# Patient Record
Sex: FEMALE | Race: OTHER | Employment: FULL TIME
[De-identification: names, ages, dates, MRNs, and addresses within clinical notes are randomized per-mention and may not be internally consistent; named-entity substitution may affect disease eponyms.]

---

## 2022-08-11 ENCOUNTER — OFFICE VISIT (OUTPATIENT)
Dept: INTERNAL MEDICINE CLINIC | Facility: CLINIC | Age: 42
End: 2022-08-11
Payer: COMMERCIAL

## 2022-08-11 VITALS
HEIGHT: 66 IN | BODY MASS INDEX: 25.52 KG/M2 | SYSTOLIC BLOOD PRESSURE: 106 MMHG | RESPIRATION RATE: 14 BRPM | DIASTOLIC BLOOD PRESSURE: 70 MMHG | WEIGHT: 158.8 LBS | HEART RATE: 59 BPM | OXYGEN SATURATION: 100 %

## 2022-08-11 DIAGNOSIS — Z00.00 ANNUAL PHYSICAL EXAM: Primary | ICD-10-CM

## 2022-08-11 DIAGNOSIS — J45.909 ASTHMA DUE TO SEASONAL ALLERGIES: ICD-10-CM

## 2022-08-11 PROCEDURE — 99386 PREV VISIT NEW AGE 40-64: CPT | Performed by: INTERNAL MEDICINE

## 2022-08-11 ASSESSMENT — ENCOUNTER SYMPTOMS
CHEST TIGHTNESS: 0
SORE THROAT: 0
SINUS PAIN: 0
BACK PAIN: 0
BLOOD IN STOOL: 0
COUGH: 0
CONSTIPATION: 0
CHOKING: 0
ABDOMINAL DISTENTION: 0
DIARRHEA: 0

## 2022-08-11 ASSESSMENT — PATIENT HEALTH QUESTIONNAIRE - PHQ9
SUM OF ALL RESPONSES TO PHQ QUESTIONS 1-9: 0
SUM OF ALL RESPONSES TO PHQ9 QUESTIONS 1 & 2: 0
SUM OF ALL RESPONSES TO PHQ QUESTIONS 1-9: 0
1. LITTLE INTEREST OR PLEASURE IN DOING THINGS: 0
2. FEELING DOWN, DEPRESSED OR HOPELESS: 0

## 2022-08-11 ASSESSMENT — ANXIETY QUESTIONNAIRES
4. TROUBLE RELAXING: 0
2. NOT BEING ABLE TO STOP OR CONTROL WORRYING: 0
5. BEING SO RESTLESS THAT IT IS HARD TO SIT STILL: 0
GAD7 TOTAL SCORE: 0
7. FEELING AFRAID AS IF SOMETHING AWFUL MIGHT HAPPEN: 0
6. BECOMING EASILY ANNOYED OR IRRITABLE: 0
3. WORRYING TOO MUCH ABOUT DIFFERENT THINGS: 0
1. FEELING NERVOUS, ANXIOUS, OR ON EDGE: 0

## 2022-08-11 NOTE — PROGRESS NOTES
Chief Complaint   Patient presents with    [address] is a 36 y.o. female who presents today for Establish Care    Here for annual exam, and establishing care, she is originally from [facility], has been in [place] for the last 25 years, she is , has 3 kids, ages of 21, 21 and 15 years, currently working in administration with a [employer]. She reports previous primary care needs to [facility], only as needed. She had her Pap signed by GYN, she reports intrauterine device placed 3 years ago. Recent breast augmentation surgery in [place], surgery was 2 weeks ago, she reports significant pain, and uncomfortable feeling. But denies any redness, discharge, or fever. Further work-up she reports having mammogram done, x-rays, and EKG was told there was not significant abnormalities    Has history of seasonal allergies with associated asthma-like disease, she has been seen by allergy specialist, I was told that she may qualify for further therapy, but at that time she was pregnant and could not pursue therapy for allergies. Reports being allergic to the grass, and usually require albuterol, purple disc inhaler which she believes is Advair. But she is not using it at this time, but may needed in the future. Wt Readings from Last 3 Encounters:   22 158 lb 12.8 oz (72 kg)       Vitals:    22 1326   BP: 106/70   Site: Left Upper Arm   Position: Sitting   Pulse: 59   Resp: 14   SpO2: 100%   Weight: 158 lb 12.8 oz (72 kg)   Height: 5' 6\" (1.676 m)        No current outpatient medications on file. Family History   Problem Relation Age of Onset    Diabetes Mother     Diabetes Father 29    Kidney Disease Father         History reviewed. No pertinent past medical history.      Past Surgical History:   Procedure Laterality Date    BREAST ENHANCEMENT SURGERY  2022     SECTION      X3    COSMETIC SURGERY      neck        No flowsheet data found.     IMMUNIZATIONS    There is no immunization history on file for this patient. Health Maintenance Due   Topic Date Due    COVID-19 Vaccine (1) Never done    HIV screen  Never done    Hepatitis C screen  Never done    DTaP/Tdap/Td vaccine (1 - Tdap) Never done    Cervical cancer screen  Never done    Diabetes screen  Never done    Lipids  Never done            /70 (Site: Left Upper Arm, Position: Sitting)   Pulse 59   Resp 14   Ht 5' 6\" (1.676 m)   Wt 158 lb 12.8 oz (72 kg)   LMP  (LMP Unknown)   SpO2 100%   BMI 25.63 kg/m²     Review of Systems   Constitutional:  Negative for activity change, appetite change, chills, fatigue, fever and unexpected weight change. HENT:  Negative for congestion, postnasal drip, sinus pain and sore throat. Respiratory:  Negative for cough, choking and chest tightness. Cardiovascular:  Negative for chest pain, palpitations and leg swelling. Gastrointestinal:  Negative for abdominal distention, blood in stool, constipation and diarrhea. Endocrine: Negative for polydipsia, polyphagia and polyuria. Genitourinary:  Negative for difficulty urinating, dysuria and hematuria. Musculoskeletal:  Positive for myalgias. Negative for arthralgias, back pain and neck pain. Skin:  Negative for rash. Allergic/Immunologic: Positive for environmental allergies. Neurological:  Negative for dizziness and headaches. Psychiatric/Behavioral:  Negative for sleep disturbance and suicidal ideas. The patient is not nervous/anxious. Physical Exam  Vitals reviewed. Constitutional:       Appearance: Normal appearance. HENT:      Head: Normocephalic and atraumatic. Cardiovascular:      Rate and Rhythm: Normal rate and regular rhythm. Pulmonary:      Effort: Pulmonary effort is normal.      Breath sounds: Normal breath sounds. Chest:   Breasts:     Right: Swelling and tenderness present. No bleeding or nipple discharge.       Left: Swelling and tenderness present. No bleeding or nipple discharge. Comments: Lateral breast implants, swelling, with clean surgical incisions, she had a large bruise in the left breast, and bruises in the anterior chest, and neck  Abdominal:      General: Abdomen is flat. Palpations: Abdomen is soft. Musculoskeletal:         General: Normal range of motion. Cervical back: Normal range of motion. Skin:     General: Skin is warm. Capillary Refill: Capillary refill takes less than 2 seconds. Neurological:      General: No focal deficit present. Mental Status: She is alert and oriented to person, place, and time. Psychiatric:         Mood and Affect: Mood normal.         Behavior: Behavior normal.         Assessment  And plan    1. Annual physical exam  -     HIV 1/2 Ag/Ab, 4TH Generation,W Rflx Confirm; Future  -     CBC with Auto Differential; Future  -     Comprehensive Metabolic Panel; Future  -     Lipid Panel; Future  -     TSH; Future  -     Hepatitis C Antibody; Future  2. Asthma due to seasonal allergies    Discussed Primary prevention aims to avert the development of disease including Immunizations, life style modifications (smoking cessation, promoting physical activity: 30 minutes x 3 week of moderate activity , diet changes ), Also discussed about Secondary prevention and in how it focuses on early detection and treatment of asymptomatic disease. Screening for cancer, hearing ,dental or vision impairment, and how failure to do so may result in disease and even dead     She was instructed to consider vaccination to take we will call department for tetanus shot, she will also reestablish care with GYN for Paps, and IUD management. Will complete blood work. Return in about 1 year (around 8/11/2023), or if symptoms worsen or fail to improve, for annual.     This document was generated with the aid of voice recognition software. . Please be aware that there may be inadvertent transcription errors not identified and corrected by the [employer]

## 2022-09-09 DIAGNOSIS — Z00.00 ANNUAL PHYSICAL EXAM: ICD-10-CM

## 2022-09-09 LAB
ALBUMIN SERPL-MCNC: 4.1 G/DL (ref 3.5–5)
ALBUMIN/GLOB SERPL: 1.2 {RATIO} (ref 1.2–3.5)
ALP SERPL-CCNC: 67 U/L (ref 50–136)
ALT SERPL-CCNC: 51 U/L (ref 12–65)
ANION GAP SERPL CALC-SCNC: 5 MMOL/L (ref 4–13)
AST SERPL-CCNC: 28 U/L (ref 15–37)
BASOPHILS # BLD: 0.1 K/UL (ref 0–0.2)
BASOPHILS NFR BLD: 1 % (ref 0–2)
BILIRUB SERPL-MCNC: 0.3 MG/DL (ref 0.2–1.1)
BUN SERPL-MCNC: 13 MG/DL (ref 6–23)
CALCIUM SERPL-MCNC: 9.8 MG/DL (ref 8.3–10.4)
CHLORIDE SERPL-SCNC: 104 MMOL/L (ref 101–110)
CHOLEST SERPL-MCNC: 178 MG/DL
CO2 SERPL-SCNC: 27 MMOL/L (ref 21–32)
CREAT SERPL-MCNC: 0.7 MG/DL (ref 0.6–1)
DIFFERENTIAL METHOD BLD: NORMAL
EOSINOPHIL # BLD: 0.3 K/UL (ref 0–0.8)
EOSINOPHIL NFR BLD: 4 % (ref 0.5–7.8)
ERYTHROCYTE [DISTWIDTH] IN BLOOD BY AUTOMATED COUNT: 11.9 % (ref 11.9–14.6)
GLOBULIN SER CALC-MCNC: 3.3 G/DL (ref 2.3–3.5)
GLUCOSE SERPL-MCNC: 105 MG/DL (ref 65–100)
HCT VFR BLD AUTO: 44.2 % (ref 35.8–46.3)
HCV AB SER QL: NONREACTIVE
HDLC SERPL-MCNC: 56 MG/DL (ref 40–60)
HDLC SERPL: 3.2 {RATIO}
HGB BLD-MCNC: 14.6 G/DL (ref 11.7–15.4)
HIV 1+2 AB+HIV1 P24 AG SERPL QL IA: NONREACTIVE
HIV 1/2 RESULT COMMENT: NORMAL
IMM GRANULOCYTES # BLD AUTO: 0 K/UL (ref 0–0.5)
IMM GRANULOCYTES NFR BLD AUTO: 1 % (ref 0–5)
LDLC SERPL CALC-MCNC: 74.4 MG/DL
LYMPHOCYTES # BLD: 2.2 K/UL (ref 0.5–4.6)
LYMPHOCYTES NFR BLD: 34 % (ref 13–44)
MCH RBC QN AUTO: 31.7 PG (ref 26.1–32.9)
MCHC RBC AUTO-ENTMCNC: 33 G/DL (ref 31.4–35)
MCV RBC AUTO: 96.1 FL (ref 79.6–97.8)
MONOCYTES # BLD: 0.5 K/UL (ref 0.1–1.3)
MONOCYTES NFR BLD: 7 % (ref 4–12)
NEUTS SEG # BLD: 3.4 K/UL (ref 1.7–8.2)
NEUTS SEG NFR BLD: 53 % (ref 43–78)
NRBC # BLD: 0 K/UL (ref 0–0.2)
PLATELET # BLD AUTO: 299 K/UL (ref 150–450)
PMV BLD AUTO: 11.7 FL (ref 9.4–12.3)
POTASSIUM SERPL-SCNC: 4.2 MMOL/L (ref 3.5–5.1)
PROT SERPL-MCNC: 7.4 G/DL (ref 6.3–8.2)
RBC # BLD AUTO: 4.6 M/UL (ref 4.05–5.2)
SODIUM SERPL-SCNC: 136 MMOL/L (ref 136–145)
TRIGL SERPL-MCNC: 238 MG/DL (ref 35–150)
TSH, 3RD GENERATION: 1.75 UIU/ML (ref 0.36–3.74)
VLDLC SERPL CALC-MCNC: 47.6 MG/DL (ref 6–23)
WBC # BLD AUTO: 6.4 K/UL (ref 4.3–11.1)

## 2022-10-19 ENCOUNTER — OFFICE VISIT (OUTPATIENT)
Dept: INTERNAL MEDICINE CLINIC | Facility: CLINIC | Age: 42
End: 2022-10-19
Payer: COMMERCIAL

## 2022-10-19 VITALS
HEIGHT: 66 IN | HEART RATE: 74 BPM | BODY MASS INDEX: 25.75 KG/M2 | SYSTOLIC BLOOD PRESSURE: 124 MMHG | RESPIRATION RATE: 16 BRPM | OXYGEN SATURATION: 95 % | DIASTOLIC BLOOD PRESSURE: 84 MMHG | WEIGHT: 160.2 LBS

## 2022-10-19 DIAGNOSIS — L81.9 HYPERPIGMENTATION OF SKIN OF CHEEK: ICD-10-CM

## 2022-10-19 DIAGNOSIS — R73.01 FASTING HYPERGLYCEMIA: ICD-10-CM

## 2022-10-19 DIAGNOSIS — M71.30 SYNOVIAL CYST: ICD-10-CM

## 2022-10-19 DIAGNOSIS — E78.1 HYPERTRIGLYCERIDEMIA: Primary | ICD-10-CM

## 2022-10-19 PROBLEM — J45.909 ASTHMA: Status: ACTIVE | Noted: 2018-04-16

## 2022-10-19 PROBLEM — J30.9 ALLERGIC RHINITIS: Status: ACTIVE | Noted: 2018-04-16

## 2022-10-19 PROCEDURE — 99214 OFFICE O/P EST MOD 30 MIN: CPT | Performed by: INTERNAL MEDICINE

## 2022-10-19 RX ORDER — ALBUTEROL SULFATE 90 UG/1
AEROSOL, METERED RESPIRATORY (INHALATION)
COMMUNITY

## 2022-10-19 RX ORDER — FLUTICASONE PROPIONATE AND SALMETEROL 250; 50 UG/1; UG/1
POWDER RESPIRATORY (INHALATION)
COMMUNITY

## 2022-10-19 ASSESSMENT — ENCOUNTER SYMPTOMS
ABDOMINAL DISTENTION: 0
CONSTIPATION: 0
SHORTNESS OF BREATH: 0
COUGH: 0
ABDOMINAL PAIN: 0
CHEST TIGHTNESS: 0

## 2022-10-19 NOTE — ASSESSMENT & PLAN NOTE
What you can do to reduce your cholesterol and risk for heart attack and stroke:     Be physically active: Aim for 30 minutes of planned moving activity most days of the week. Include resistance training with 8 to 10 different exercises, 1-2 sets per exercise, and 10-15 repetitions at moderate intensity on two or more days per week. Fill your plate with mostly whole grains, fruits and vegetables with a sprinkle of lean meats and low fat dairy foods. Strive for a healthy weight. If weight loss is a goal, remember it only takes a 10% weight loss to reduce your risk for disease. If you drink alcohol, limit your daily consumption to one drink for women and two drinks for men.

## 2022-10-19 NOTE — PROGRESS NOTES
Chief Complaint   Patient presents with    Follow-up     \"Bump\" on right index finger X 1 month. Luis Linares is a 36 y.o. female who presents today for Follow-up (\"Bump\" on right index finger X 1 month. )    Follow-up in recent labs, showing hypertriglycerides levels, and hyperglycemia, since last visit also she has gained some weight, she reports stress at work, has not been able to exercise, today she is concerned about small bump in the right index finger that is started a month ago, and is not tender, not red,  She also is requesting referral to dermatology for hyperpigmented lesions, she has been seen by dermatology in the past but has not seen good progress. Wt Readings from Last 3 Encounters:   10/19/22 160 lb 3.2 oz (72.7 kg)   08/11/22 158 lb 12.8 oz (72 kg)     Vitals:    10/19/22 0841   BP: 124/84   Site: Left Upper Arm   Position: Sitting   Pulse: 74   Resp: 16   SpO2: 95%   Weight: 160 lb 3.2 oz (72.7 kg)   Height: 5' 6\" (1.676 m)        Assessment and plan:  1. Hypertriglyceridemia  Assessment & Plan:    What you can do to reduce your cholesterol and risk for heart attack and stroke:     Be physically active: Aim for 30 minutes of planned moving activity most days of the week. Include resistance training with 8 to 10 different exercises, 1-2 sets per exercise, and 10-15 repetitions at moderate intensity on two or more days per week. Fill your plate with mostly whole grains, fruits and vegetables with a sprinkle of lean meats and low fat dairy foods. Strive for a healthy weight. If weight loss is a goal, remember it only takes a 10% weight loss to reduce your risk for disease. If you drink alcohol, limit your daily consumption to one drink for women and two drinks for men. Orders:  -     Lipid Panel; Future  2. Fasting hyperglycemia  Assessment & Plan:    Will focus in dietary changes and increase walking  Orders:  -     Hemoglobin A1C; Future  -     Comprehensive Metabolic Panel; Future  3. Synovial cyst  4. Hyperpigmentation of skin of cheek  -     [facility]    Return in about 3 months (around 2023) for hyperlipidemia, hyperglycemia. Review of system:    Review of Systems   Constitutional:  Negative for activity change, chills, fatigue and fever. Respiratory:  Negative for cough, chest tightness and shortness of breath. Cardiovascular:  Negative for chest pain. Gastrointestinal:  Negative for abdominal distention, abdominal pain and constipation. Neurological:  Negative for dizziness and headaches. Psychiatric/Behavioral:  The patient is not nervous/anxious. Immunization history:    Immunization History   Administered Date(s) Administered    COVID-19, PFIZER PURPLE top, DILUTE for use, (age 15 y+), 30mcg/0.3mL 2021, 2021    Hepatitis B 2004, 03/10/2005    Hepatitis B Adult (Engerix-B) 2004, 2004, 2005    MMR 2004    Td (Adult), 2 Lf Tetanus Toxoid, Pf (Td, Absorbed) 2004       Current medications:      Current Outpatient Medications:     fluticasone-salmeterol (ADVAIR) 250-50 MCG/ACT AEPB diskus inhaler, Advair Diskus 250 mcg-50 mcg/dose powder for inhalation  Inhale 1 puff twice a day by inhalation route., Disp: , Rfl:     albuterol sulfate HFA (PROVENTIL;VENTOLIN;PROAIR) 108 (90 Base) MCG/ACT inhaler, ProAir HFA 90 mcg/actuation aerosol inhaler  Inhale 2 puffs every 4-6 hours by inhalation route as needed for 30 days. , Disp: , Rfl:       Family history:    Family History   Problem Relation Age of Onset    Diabetes Mother     Diabetes Father 29    Kidney Disease Father         Past medical history:    History reviewed. No pertinent past medical history.      Past Surgical History:   Procedure Laterality Date    BREAST ENHANCEMENT SURGERY  2022     SECTION      X3    COSMETIC SURGERY      neck        Physical exam:    /84 (Site: Left Upper Arm, Position: Sitting) Pulse 74   Resp 16   Ht 5' 6\" (1.676 m)   Wt 160 lb 3.2 oz (72.7 kg)   SpO2 95%   BMI 25.86 kg/m²     Physical Exam  Vitals reviewed. Musculoskeletal:      Comments: Small cyst  in R finger index   Skin:     Comments: Face hyperpigmented patches        Recent labs:    Lab Results   Component Value Date    CHOL 178 09/09/2022     Lab Results   Component Value Date    TRIG 238 (H) 09/09/2022     Lab Results   Component Value Date    HDL 56 09/09/2022     Lab Results   Component Value Date    LDLCALC 74.4 09/09/2022     Lab Results   Component Value Date    LABVLDL 47.6 (H) 09/09/2022     Lab Results   Component Value Date    CHOLHDLRATIO 3.2 09/09/2022     Lab Results   Component Value Date     09/09/2022    K 4.2 09/09/2022     09/09/2022    CO2 27 09/09/2022    BUN 13 09/09/2022    CREATININE 0.70 09/09/2022    GLUCOSE 105 (H) 09/09/2022    CALCIUM 9.8 09/09/2022    PROT 7.4 09/09/2022    LABALBU 4.1 09/09/2022    BILITOT 0.3 09/09/2022    ALKPHOS 67 09/09/2022    AST 28 09/09/2022    ALT 51 09/09/2022    LABGLOM >60 09/09/2022    GFRAA >60 09/09/2022    GLOB 3.3 09/09/2022     Lab Results   Component Value Date    WBC 6.4 09/09/2022    HGB 14.6 09/09/2022    HCT 44.2 09/09/2022    MCV 96.1 09/09/2022     09/09/2022             This document was generated with the aid of voice recognition software. . Please be aware that there may be inadvertent transcription errors not identified and corrected by the [employer]

## 2022-10-19 NOTE — PATIENT INSTRUCTIONS
A referral has been placed for you. Someone will call you with the details of the appointment and process. Please keep your appointment or be sure to call if you need to reschedule. If you have not received any call after 1 to 2 weeks please contact us.       Labs in 3 months

## 2022-11-16 DIAGNOSIS — E78.1 HYPERTRIGLYCERIDEMIA: ICD-10-CM

## 2022-11-16 DIAGNOSIS — R73.01 FASTING HYPERGLYCEMIA: ICD-10-CM

## 2022-11-17 LAB
ALBUMIN SERPL-MCNC: 4 G/DL (ref 3.5–5)
ALBUMIN/GLOB SERPL: 1.3 {RATIO} (ref 0.4–1.6)
ALP SERPL-CCNC: 61 U/L (ref 50–136)
ALT SERPL-CCNC: 56 U/L (ref 12–65)
ANION GAP SERPL CALC-SCNC: 8 MMOL/L (ref 2–11)
AST SERPL-CCNC: 27 U/L (ref 15–37)
BILIRUB SERPL-MCNC: 0.3 MG/DL (ref 0.2–1.1)
BUN SERPL-MCNC: 11 MG/DL (ref 6–23)
CALCIUM SERPL-MCNC: 10.5 MG/DL (ref 8.3–10.4)
CHLORIDE SERPL-SCNC: 102 MMOL/L (ref 101–110)
CHOLEST SERPL-MCNC: 203 MG/DL
CO2 SERPL-SCNC: 28 MMOL/L (ref 21–32)
CREAT SERPL-MCNC: 0.7 MG/DL (ref 0.6–1)
EST. AVERAGE GLUCOSE BLD GHB EST-MCNC: 123 MG/DL
GLOBULIN SER CALC-MCNC: 3 G/DL (ref 2.8–4.5)
GLUCOSE SERPL-MCNC: 95 MG/DL (ref 65–100)
HBA1C MFR BLD: 5.9 % (ref 4.8–5.6)
HDLC SERPL-MCNC: 66 MG/DL (ref 40–60)
HDLC SERPL: 3.1 {RATIO}
LDLC SERPL CALC-MCNC: 102 MG/DL
POTASSIUM SERPL-SCNC: 4.5 MMOL/L (ref 3.5–5.1)
PROT SERPL-MCNC: 7 G/DL (ref 6.3–8.2)
SODIUM SERPL-SCNC: 138 MMOL/L (ref 133–143)
TRIGL SERPL-MCNC: 175 MG/DL (ref 35–150)
VLDLC SERPL CALC-MCNC: 35 MG/DL (ref 6–23)

## 2023-01-30 ENCOUNTER — OFFICE VISIT (OUTPATIENT)
Dept: INTERNAL MEDICINE CLINIC | Facility: CLINIC | Age: 43
End: 2023-01-30
Payer: COMMERCIAL

## 2023-01-30 VITALS
SYSTOLIC BLOOD PRESSURE: 106 MMHG | HEIGHT: 66 IN | DIASTOLIC BLOOD PRESSURE: 78 MMHG | WEIGHT: 160.8 LBS | HEART RATE: 74 BPM | OXYGEN SATURATION: 98 % | BODY MASS INDEX: 25.84 KG/M2

## 2023-01-30 DIAGNOSIS — Z01.419 CERVICAL SMEAR, AS PART OF ROUTINE GYNECOLOGICAL EXAMINATION: ICD-10-CM

## 2023-01-30 DIAGNOSIS — Z97.5 IUD (INTRAUTERINE DEVICE) IN PLACE: ICD-10-CM

## 2023-01-30 DIAGNOSIS — J45.901 MODERATE ASTHMA WITH ACUTE EXACERBATION, UNSPECIFIED WHETHER PERSISTENT: Primary | ICD-10-CM

## 2023-01-30 DIAGNOSIS — J30.9 ALLERGIC RHINITIS, UNSPECIFIED SEASONALITY, UNSPECIFIED TRIGGER: ICD-10-CM

## 2023-01-30 PROCEDURE — 99214 OFFICE O/P EST MOD 30 MIN: CPT | Performed by: INTERNAL MEDICINE

## 2023-01-30 RX ORDER — FLUTICASONE PROPIONATE AND SALMETEROL 250; 50 UG/1; UG/1
POWDER RESPIRATORY (INHALATION)
Qty: 60 EACH | Refills: 1 | Status: SHIPPED | OUTPATIENT
Start: 2023-01-30

## 2023-01-30 ASSESSMENT — ENCOUNTER SYMPTOMS
SHORTNESS OF BREATH: 0
CHEST TIGHTNESS: 0
COUGH: 0
ABDOMINAL DISTENTION: 0
CONSTIPATION: 0
ABDOMINAL PAIN: 0

## 2023-01-30 NOTE — PATIENT INSTRUCTIONS
Health Maintenance Due   Topic Date Due    Pneumococcal 0-64 years Vaccine (1 - PCV) Never done    DTaP/Tdap/Td vaccine (1 - Tdap) 08/19/2004         COVID-19 Vaccine (3 - Booster for Pfizer series) 09/05/2021    Flu vaccine (1) Never done

## 2023-01-30 NOTE — PROGRESS NOTES
Chief Complaint   Patient presents with    Gynecologic Exam     Needs referral to Dermatologist.        Shailesh Soliman is a 36 y.o. female who presents today for Gynecologic Exam (Needs referral to Dermatologist.)     She is here requesting Pap examination, during the unknown exam back in August was not completed as she was planning to follow-up with her OB/GYN, but unfortunately has not been able to make a follow-up, she has an IUD for the last 3 years, she denies any previous abnormal Paps, she reports occasional spotting, last period was at the beginning of the month. She is considering tubal ligation in the future if possible. She would like to have the IUD removed as she is concerned about her changes in coloration of the face, to be associated with IUD, she has been following with dermatology, she wants to get a second opinion, as a current treatment given by her dermatologist has not been helpful, referral was placed back in October, but she has not yet scheduled that appointment. Information has been shared today    She also is requesting her inhaler for asthma, and is currently 40 reactivation of her allergies, as spring is coming. She is inquiring about allergy testing, and shots. She reports antihistaminics and previous attempts to manage her allergies were Not useful    Wt Readings from Last 3 Encounters:   01/30/23 160 lb 12.8 oz (72.9 kg)   10/19/22 160 lb 3.2 oz (72.7 kg)   08/11/22 158 lb 12.8 oz (72 kg)     Vitals:    01/30/23 0844   BP: 106/78   Site: Left Upper Arm   Position: Sitting   Pulse: 74   SpO2: 98%   Weight: 160 lb 12.8 oz (72.9 kg)   Height: 5' 6\" (1.676 m)        Assessment and plan:  1. Moderate asthma with acute exacerbation, unspecified whether persistent  -     fluticasone-salmeterol (ADVAIR) 250-50 MCG/ACT AEPB diskus inhaler;  Advair Diskus 250 mcg-50 mcg/dose powder for inhalation  Inhale 1 puff twice a day by inhalation route., Disp-60 each, R-1Normal  -     [facility]  2. Allergic rhinitis, unspecified seasonality, unspecified trigger  -     [facility]  3. IUD (intrauterine device) in place  -     [address] [facility] ([place], [place]), [place], , Gynecology, [place]  4. Cervical smear, as part of routine gynecological examination  -     PAP IGP,Aptima HPV Age Guideline; Future  Refill of her medications have been placed,  Information about referral placed in October has been given to the patient. Allergy referral has been placed as patient is interested to discuss possible treatments    Referral to gynecology has been placed, we discussed about the findings today during his cervical examination, she will be discussing these findings, and her IUD management with GYN,    No follow-ups on file. Review of system:    Review of Systems   Constitutional:  Negative for activity change, chills, fatigue and fever. Respiratory:  Negative for cough, chest tightness and shortness of breath. Cardiovascular:  Negative for chest pain. Gastrointestinal:  Negative for abdominal distention, abdominal pain and constipation. Genitourinary:  Negative for dyspareunia and pelvic pain. Skin:  Positive for rash. Allergic/Immunologic: Positive for environmental allergies. Neurological:  Negative for dizziness and headaches. Psychiatric/Behavioral:  The patient is not nervous/anxious.         Immunization history:    Immunization History   Administered Date(s) Administered    COVID-19, PFIZER PURPLE top, DILUTE for use, (age 15 y+), 30mcg/0.3mL 04/08/2021, 07/11/2021    Hepatitis B 08/18/2004, 03/10/2005    Hepatitis B Adult (Engerix-B) 08/18/2004, 09/19/2004, 02/19/2005    MMR 08/18/2004    Td (Adult), 2 Lf Tetanus Toxoid, Pf (Td, Absorbed) 08/18/2004       Current medications:      Current Outpatient Medications:     fluticasone-salmeterol (ADVAIR) 250-50 MCG/ACT AEPB diskus inhaler, Advair Diskus 250 mcg-50 mcg/dose powder for inhalation  Inhale 1 puff twice a day by inhalation route., Disp: 60 each, Rfl: 1    albuterol sulfate HFA (PROVENTIL;VENTOLIN;PROAIR) 108 (90 Base) MCG/ACT inhaler, ProAir HFA 90 mcg/actuation aerosol inhaler  Inhale 2 puffs every 4-6 hours by inhalation route as needed for 30 days. , Disp: , Rfl:       Family history:    Family History   Problem Relation Age of Onset    Diabetes Mother     Diabetes Father 29    Kidney Disease Father         Past medical history:    History reviewed. No pertinent past medical history. Past Surgical History:   Procedure Laterality Date    BREAST ENHANCEMENT SURGERY  2022     SECTION      X3    COSMETIC SURGERY      neck        Physical exam:    /78 (Site: Left Upper Arm, Position: Sitting)   Pulse 74   Ht 5' 6\" (1.676 m)   Wt 160 lb 12.8 oz (72.9 kg)   LMP  (LMP Unknown)   SpO2 98%   BMI 25.95 kg/m²     Physical Exam  Vitals reviewed. Constitutional:       Appearance: Normal appearance. HENT:      Head: Normocephalic and atraumatic. Cardiovascular:      Rate and Rhythm: Normal rate. Pulmonary:      Effort: Pulmonary effort is normal.   Abdominal:      Palpations: Abdomen is soft. Genitourinary:     Vagina: No vaginal discharge. Cervix: Normal.      Adnexa:         Right: No mass. Left: No mass. Neurological:      General: No focal deficit present. Mental Status: She is alert and oriented to person, place, and time.    Psychiatric:         Mood and Affect: Mood normal.         Behavior: Behavior normal.        Recent labs:    Lab Results   Component Value Date    CHOL 203 (H) 2022    CHOL 178 2022     Lab Results   Component Value Date    TRIG 175 (H) 2022    TRIG 238 (H) 2022     Lab Results   Component Value Date    HDL 66 (H) 2022    HDL 56 2022     Lab Results   Component Value Date    LDLCALC 102 (H) 2022    LDLCALC 74.4 2022     Lab Results Component Value Date    LABVLDL 35 (H) 11/16/2022    LABVLDL 47.6 (H) 09/09/2022     Lab Results   Component Value Date    CHOLHDLRATIO 3.1 11/16/2022    CHOLHDLRATIO 3.2 09/09/2022     Lab Results   Component Value Date     11/16/2022    K 4.5 11/16/2022     11/16/2022    CO2 28 11/16/2022    BUN 11 11/16/2022    CREATININE 0.70 11/16/2022    GLUCOSE 95 11/16/2022    CALCIUM 10.5 (H) 11/16/2022    PROT 7.0 11/16/2022    LABALBU 4.0 11/16/2022    BILITOT 0.3 11/16/2022    ALKPHOS 61 11/16/2022    AST 27 11/16/2022    ALT 56 11/16/2022    LABGLOM >60 11/16/2022    GFRAA >60 09/09/2022    GLOB 3.0 11/16/2022     Lab Results   Component Value Date    WBC 6.4 09/09/2022    HGB 14.6 09/09/2022    HCT 44.2 09/09/2022    MCV 96.1 09/09/2022     09/09/2022             This document was generated with the aid of voice recognition software. . Please be aware that there may be inadvertent transcription errors not identified and corrected by the [employer]

## 2023-02-02 ENCOUNTER — TELEMEDICINE (OUTPATIENT)
Dept: INTERNAL MEDICINE CLINIC | Facility: CLINIC | Age: 43
End: 2023-02-02
Payer: COMMERCIAL

## 2023-02-02 DIAGNOSIS — J02.9 SORETHROAT: Primary | ICD-10-CM

## 2023-02-02 DIAGNOSIS — U07.1 COVID-19 VIRUS INFECTION: ICD-10-CM

## 2023-02-02 PROCEDURE — 99214 OFFICE O/P EST MOD 30 MIN: CPT | Performed by: INTERNAL MEDICINE

## 2023-02-02 SDOH — ECONOMIC STABILITY: FOOD INSECURITY: WITHIN THE PAST 12 MONTHS, YOU WORRIED THAT YOUR FOOD WOULD RUN OUT BEFORE YOU GOT MONEY TO BUY MORE.: PATIENT DECLINED

## 2023-02-02 SDOH — ECONOMIC STABILITY: TRANSPORTATION INSECURITY
IN THE PAST 12 MONTHS, HAS LACK OF TRANSPORTATION KEPT YOU FROM MEETINGS, WORK, OR FROM GETTING THINGS NEEDED FOR DAILY LIVING?: PATIENT DECLINED

## 2023-02-02 SDOH — ECONOMIC STABILITY: HOUSING INSECURITY
IN THE LAST 12 MONTHS, WAS THERE A TIME WHEN YOU DID NOT HAVE A STEADY PLACE TO SLEEP OR SLEPT IN A SHELTER (INCLUDING NOW)?: PATIENT REFUSED

## 2023-02-02 SDOH — ECONOMIC STABILITY: FOOD INSECURITY: WITHIN THE PAST 12 MONTHS, THE FOOD YOU BOUGHT JUST DIDN'T LAST AND YOU DIDN'T HAVE MONEY TO GET MORE.: PATIENT DECLINED

## 2023-02-02 SDOH — ECONOMIC STABILITY: INCOME INSECURITY: HOW HARD IS IT FOR YOU TO PAY FOR THE VERY BASICS LIKE FOOD, HOUSING, MEDICAL CARE, AND HEATING?: PATIENT DECLINED

## 2023-02-02 ASSESSMENT — ENCOUNTER SYMPTOMS
SHORTNESS OF BREATH: 1
COUGH: 1
TROUBLE SWALLOWING: 1
SINUS PAIN: 1
RHINORRHEA: 1
SORE THROAT: 1

## 2023-02-02 NOTE — PROGRESS NOTES
Carrie Marks (:  1980) is a Established patient, here for evaluation of the following:  Chief Complaint   Patient presents with    Pharyngitis     Started sx 2 days ago. Sore throat, ha, sinus pressure, ear pain, feels hot. Pt is going to do a Covid test as soon as she gets one from the pharmacy. Assessment & Plan   Below is the assessment and plan developed based on review of pertinent history, physical exam, labs, studies, and medications. 1. Sorethroat  -     nirmatrelvir/ritonavir 300/100 (PAXLOVID) 20 x 150 MG & 10 x 100MG TBPK; Take 3 tablets (two 150 mg nirmatrelvir and one 100 mg ritonavir tablets) by mouth every 12 hours for 5 days. , Disp-30 tablet, R-0Normal  2. COVID-19 virus infection  -     nirmatrelvir/ritonavir 300/100 (PAXLOVID) 20 x 150 MG & 10 x 100MG TBPK; Take 3 tablets (two 150 mg nirmatrelvir and one 100 mg ritonavir tablets) by mouth every 12 hours for 5 days. , Disp-30 tablet, R-0Normal  Tested positive for COVID, we discussed about the options of treatment, she is going to continue using her inhalers, and will start antiviral medication. Discussed with patient supportive measures normal saline solution in each nostril, flonase  , humidifer , increase fluid intake , tylenol or Nsaids PRN , (if not Contraindicated), Red flags explained : cough > 4 weeks , fever > 48 hours with not response to tylenol , worsening of symptoms , bloody sputum. , if any o this was advised to come back    Return if symptoms worsen or fail to improve. Subjective   Patient is presenting today with sore throat, chills, sinus congestion, subjective fever, postnasal drip, malaise, body aches, that is started 2 days ago, she has been trying her inhalers, but has been feeling worse, patient has not checked for COVID. She has been highly encouraged to check for COVID, as she has a history of asthma, and this is still in the therapeutic window.   COVID POSITIVE + , called her back at 2:40 pm    Pharyngitis  Associated symptoms include chills, congestion, coughing, diaphoresis, fatigue, headaches and a sore throat. Review of Systems   Constitutional:  Positive for chills, diaphoresis and fatigue. HENT:  Positive for congestion, postnasal drip, rhinorrhea, sinus pain, sore throat and trouble swallowing. Respiratory:  Positive for cough and shortness of breath. Neurological:  Positive for headaches. Objective   Patient-Reported Vitals  No data recorded     Physical Exam  Constitutional:       General: She is not in acute distress. Appearance: Normal appearance. She is not ill-appearing, toxic-appearing or diaphoretic. HENT:      Head: Normocephalic and atraumatic. Nose: Congestion present. Pulmonary:      Effort: Pulmonary effort is normal.   Neurological:      General: No focal deficit present. Mental Status: She is alert. On this date 2/2/2023 I have spent 15 minutes reviewing previous notes, test results and face to face (virtual) with the patient discussing the diagnosis and importance of compliance with the treatment plan as well as documenting on the day of the visitRegi Ocampo, was evaluated through a synchronous (real-time) audio-video encounter. The patient (or guardian if applicable) is aware that this is a billable service, which includes applicable co-pays. This Virtual Visit was conducted with patient's (and/or legal guardian's) consent. The visit was conducted pursuant to the emergency declaration under the [address] [place] [address], [address] [place] [address] authority and the [employer] and [employer] General Act. Patient identification was verified, and a caregiver was present when appropriate.    The patient was located at Home: [address] [facility] [address]  Provider was located at [facility] ([address] [place], [place] [address]t): [address] [place] [phone]         --ADRIANNA YADAV MD

## 2023-02-07 LAB
AGE GDLN ACOG TESTING: NORMAL
CYTOLOGIST CVX/VAG CYTO: ABNORMAL
CYTOLOGY CVX/VAG DOC THIN PREP: ABNORMAL
HPV APTIMA: NEGATIVE
HPV GENOTYPE REFLEX: ABNORMAL
Lab: ABNORMAL
PATH REPORT.FINAL DX SPEC: ABNORMAL
PATHOLOGIST CVX/VAG CYTO: ABNORMAL
PATHOLOGIST PROVIDED ICD: ABNORMAL
RECOM F/U CVX/VAG CYTO: ABNORMAL
STAT OF ADQ CVX/VAG CYTO-IMP: ABNORMAL

## 2023-02-23 ENCOUNTER — OFFICE VISIT (OUTPATIENT)
Dept: OBGYN CLINIC | Age: 43
End: 2023-02-23
Payer: COMMERCIAL

## 2023-02-23 VITALS
SYSTOLIC BLOOD PRESSURE: 122 MMHG | HEIGHT: 66 IN | BODY MASS INDEX: 25.71 KG/M2 | WEIGHT: 160 LBS | DIASTOLIC BLOOD PRESSURE: 84 MMHG

## 2023-02-23 DIAGNOSIS — Z30.09 ENCOUNTER FOR COUNSELING REGARDING CONTRACEPTION: Primary | ICD-10-CM

## 2023-02-23 DIAGNOSIS — R87.610 ASCUS OF CERVIX WITH NEGATIVE HIGH RISK HPV: ICD-10-CM

## 2023-02-23 DIAGNOSIS — N95.1 PERIMENOPAUSE: ICD-10-CM

## 2023-02-23 PROCEDURE — 99203 OFFICE O/P NEW LOW 30 MIN: CPT | Performed by: OBSTETRICS & GYNECOLOGY

## 2023-02-23 RX ORDER — LEVONORGESTREL 52 MG/1
1 INTRAUTERINE DEVICE INTRAUTERINE ONCE
COMMUNITY

## 2023-02-23 NOTE — PROGRESS NOTES
CC:   Chief Complaint   Patient presents with    IUD Removal         HPI:    Kelsea Montgomery  is a 36 y.o. , No obstetric history on file., No LMP recorded (lmp unknown). (Menstrual status: IUD). ,  who is seen for contraception counseling and IUD management. IUD was placed 3 years ago and she is concerned over new facial darkening. Already seeing derm. This is her second IUD and she is otherwise happy with it. No periods. Sometimes gets a hot flash. Asking about menopausal transition. Contraception:  Mirena IUD placed 3 years       GYN HISTORY:  As per HPI     Hx STDs:  no    Last Pap:  ASCUS/HPV negative       Current Outpatient Medications on File Prior to Visit   Medication Sig Dispense Refill    levonorgestrel (MIRENA, 52 MG,) IUD 52 mg 1 each by IntraUTERine route once      fluticasone-salmeterol (ADVAIR) 250-50 MCG/ACT AEPB diskus inhaler Advair Diskus 250 mcg-50 mcg/dose powder for inhalation  Inhale 1 puff twice a day by inhalation route. 60 each 1    albuterol sulfate HFA (PROVENTIL;VENTOLIN;PROAIR) 108 (90 Base) MCG/ACT inhaler ProAir HFA 90 mcg/actuation aerosol inhaler   Inhale 2 puffs every 4-6 hours by inhalation route as needed for 30 days. No current facility-administered medications on file prior to visit. No past medical history on file. Past Surgical History:   Procedure Laterality Date    BREAST ENHANCEMENT SURGERY  2022     SECTION      X3    COSMETIC SURGERY      neck         Outpatient Encounter Medications as of 2023   Medication Sig Dispense Refill    levonorgestrel (MIRENA, 52 MG,) IUD 52 mg 1 each by IntraUTERine route once      fluticasone-salmeterol (ADVAIR) 250-50 MCG/ACT AEPB diskus inhaler Advair Diskus 250 mcg-50 mcg/dose powder for inhalation  Inhale 1 puff twice a day by inhalation route.  60 each 1    albuterol sulfate HFA (PROVENTIL;VENTOLIN;PROAIR) 108 (90 Base) MCG/ACT inhaler ProAir HFA 90 mcg/actuation aerosol inhaler   Inhale 2 puffs every 4-6 hours by inhalation route as needed for 30 days. No facility-administered encounter medications on file as of 2/23/2023. No Known Allergies      Family History   Problem Relation Age of Onset    Diabetes Father 29    Kidney Disease Father     Diabetes Mother     Breast Cancer Neg Hx     Colon Cancer Neg Hx     Ovarian Cancer Neg Hx          Social History     Socioeconomic History    Marital status:    Tobacco Use    Smoking status: Never    Smokeless tobacco: Never   Vaping Use    Vaping Use: Never used   Substance and Sexual Activity    Alcohol use: Never    Drug use: Never    Sexual activity: Yes     Partners: Male     Comment: IUD           ROS:  Review of Systems   Constitutional: Negative for chills, fever and weight loss. HENT: Negative for hearing loss. Eyes: Negative for blurred vision and double vision. Respiratory: Negative for cough, hemoptysis and shortness of breath. Cardiovascular: Negative for chest pain, palpitations and orthopnea. Gastrointestinal: Negative for abdominal pain, blood in stool, constipation, diarrhea, nausea and vomiting. Genitourinary: Negative for dysuria, frequency, hematuria and urgency. Musculoskeletal: Negative for falls, joint pain and myalgias. Skin: Negative for itching and rash. Neurological: Negative for headaches. Endo/Heme/Allergies: Does not bruise/bleed easily. Psychiatric/Behavioral: Negative for depression and suicidal ideas. The patient is not nervous/anxious. All other systems reviewed and are negative. PHYSICAL EXAM:  /84   Ht 5' 6\" (1.676 m)   Wt 160 lb (72.6 kg)   LMP  (LMP Unknown)   BMI 25.82 kg/m²        Physical Exam:  Constitutional: She appears well-developed and well-nourished. No distress. HENT:    Head: Normocephalic and atraumatic. Cardiovascular: Regular pulse   Pulmonary/Chest: Effort normal  Skin: She is not diaphoretic. Psychiatric: She has a normal mood and affect.  Her behavior is normal. Thought content normal. .      Counseling:    Patient counseled face to face for more than 50% of the total time spent with the patient. On this date I have spent 30 minutes reviewing previous notes, test results, and face to face with the patient discussing the diagnosis and importance of compliance with the treatment plan as well as documenting. ASSESSMENT/PLAN:   36 y.o., No obstetric history on file. , with    1. Contraception counseling: Will leave IUD in for now. Discussed that it is good for 8 years. Would not think it is contributing for her hyperpigmentation. Information given on other options if she desires removal just call. 2. ASCUS HPV negative: repeat cotest in 12 mo. Will schedule her. On call list  3. Perimenopause: discussed transition with her and that perimenopause can occur years before menopause.        Darcy Sheppard, DO

## 2023-07-19 ENCOUNTER — OFFICE VISIT (OUTPATIENT)
Dept: INTERNAL MEDICINE CLINIC | Facility: CLINIC | Age: 43
End: 2023-07-19
Payer: COMMERCIAL

## 2023-07-19 VITALS
WEIGHT: 157.2 LBS | HEART RATE: 70 BPM | SYSTOLIC BLOOD PRESSURE: 126 MMHG | BODY MASS INDEX: 25.26 KG/M2 | HEIGHT: 66 IN | DIASTOLIC BLOOD PRESSURE: 64 MMHG | OXYGEN SATURATION: 98 %

## 2023-07-19 DIAGNOSIS — J45.20 MILD INTERMITTENT ASTHMA WITHOUT COMPLICATION: ICD-10-CM

## 2023-07-19 DIAGNOSIS — R73.01 FASTING HYPERGLYCEMIA: ICD-10-CM

## 2023-07-19 DIAGNOSIS — J30.89 NON-SEASONAL ALLERGIC RHINITIS DUE TO OTHER ALLERGIC TRIGGER: ICD-10-CM

## 2023-07-19 DIAGNOSIS — R73.01 FASTING HYPERGLYCEMIA: Primary | ICD-10-CM

## 2023-07-19 DIAGNOSIS — E78.1 HYPERTRIGLYCERIDEMIA: ICD-10-CM

## 2023-07-19 LAB
ALBUMIN SERPL-MCNC: 4 G/DL (ref 3.5–5)
ALBUMIN/GLOB SERPL: 1.3 (ref 0.4–1.6)
ALP SERPL-CCNC: 63 U/L (ref 50–136)
ALT SERPL-CCNC: 44 U/L (ref 12–65)
ANION GAP SERPL CALC-SCNC: 8 MMOL/L (ref 2–11)
AST SERPL-CCNC: 26 U/L (ref 15–37)
BASOPHILS # BLD: 0.1 K/UL (ref 0–0.2)
BASOPHILS NFR BLD: 1 % (ref 0–2)
BILIRUB SERPL-MCNC: 0.4 MG/DL (ref 0.2–1.1)
BUN SERPL-MCNC: 11 MG/DL (ref 6–23)
CALCIUM SERPL-MCNC: 9.7 MG/DL (ref 8.3–10.4)
CHLORIDE SERPL-SCNC: 107 MMOL/L (ref 101–110)
CHOLEST SERPL-MCNC: 199 MG/DL
CO2 SERPL-SCNC: 25 MMOL/L (ref 21–32)
CREAT SERPL-MCNC: 0.6 MG/DL (ref 0.6–1)
DIFFERENTIAL METHOD BLD: NORMAL
EOSINOPHIL # BLD: 0.3 K/UL (ref 0–0.8)
EOSINOPHIL NFR BLD: 5 % (ref 0.5–7.8)
ERYTHROCYTE [DISTWIDTH] IN BLOOD BY AUTOMATED COUNT: 12.2 % (ref 11.9–14.6)
GLOBULIN SER CALC-MCNC: 3.1 G/DL (ref 2.8–4.5)
GLUCOSE SERPL-MCNC: 95 MG/DL (ref 65–100)
HCT VFR BLD AUTO: 44.9 % (ref 35.8–46.3)
HDLC SERPL-MCNC: 68 MG/DL (ref 40–60)
HDLC SERPL: 2.9
HGB BLD-MCNC: 15.1 G/DL (ref 11.7–15.4)
IMM GRANULOCYTES # BLD AUTO: 0 K/UL (ref 0–0.5)
IMM GRANULOCYTES NFR BLD AUTO: 0 % (ref 0–5)
LDLC SERPL CALC-MCNC: 99.2 MG/DL
LYMPHOCYTES # BLD: 1.9 K/UL (ref 0.5–4.6)
LYMPHOCYTES NFR BLD: 36 % (ref 13–44)
MCH RBC QN AUTO: 31.4 PG (ref 26.1–32.9)
MCHC RBC AUTO-ENTMCNC: 33.6 G/DL (ref 31.4–35)
MCV RBC AUTO: 93.3 FL (ref 82–102)
MONOCYTES # BLD: 0.4 K/UL (ref 0.1–1.3)
MONOCYTES NFR BLD: 8 % (ref 4–12)
NEUTS SEG # BLD: 2.8 K/UL (ref 1.7–8.2)
NEUTS SEG NFR BLD: 50 % (ref 43–78)
NRBC # BLD: 0 K/UL (ref 0–0.2)
PLATELET # BLD AUTO: 254 K/UL (ref 150–450)
PMV BLD AUTO: 11.9 FL (ref 9.4–12.3)
POTASSIUM SERPL-SCNC: 4.7 MMOL/L (ref 3.5–5.1)
PROT SERPL-MCNC: 7.1 G/DL (ref 6.3–8.2)
RBC # BLD AUTO: 4.81 M/UL (ref 4.05–5.2)
SODIUM SERPL-SCNC: 140 MMOL/L (ref 133–143)
TRIGL SERPL-MCNC: 159 MG/DL (ref 35–150)
TSH W FREE THYROID IF ABNORMAL: 2.23 UIU/ML (ref 0.36–3.74)
VLDLC SERPL CALC-MCNC: 31.8 MG/DL (ref 6–23)
WBC # BLD AUTO: 5.4 K/UL (ref 4.3–11.1)

## 2023-07-19 PROCEDURE — 99214 OFFICE O/P EST MOD 30 MIN: CPT | Performed by: INTERNAL MEDICINE

## 2023-07-19 RX ORDER — ALBUTEROL SULFATE 90 UG/1
AEROSOL, METERED RESPIRATORY (INHALATION)
Qty: 3 EACH | Refills: 1 | Status: SHIPPED
Start: 2023-07-19

## 2023-07-19 RX ORDER — FLUTICASONE PROPIONATE AND SALMETEROL 250; 50 UG/1; UG/1
POWDER RESPIRATORY (INHALATION)
Qty: 60 EACH | Refills: 1 | Status: SHIPPED | OUTPATIENT
Start: 2023-07-19

## 2023-07-19 ASSESSMENT — PATIENT HEALTH QUESTIONNAIRE - PHQ9
1. LITTLE INTEREST OR PLEASURE IN DOING THINGS: 0
SUM OF ALL RESPONSES TO PHQ QUESTIONS 1-9: 0
SUM OF ALL RESPONSES TO PHQ QUESTIONS 1-9: 0
SUM OF ALL RESPONSES TO PHQ9 QUESTIONS 1 & 2: 0
2. FEELING DOWN, DEPRESSED OR HOPELESS: NOT AT ALL
2. FEELING DOWN, DEPRESSED OR HOPELESS: 0
SUM OF ALL RESPONSES TO PHQ QUESTIONS 1-9: 0
1. LITTLE INTEREST OR PLEASURE IN DOING THINGS: NOT AT ALL
SUM OF ALL RESPONSES TO PHQ9 QUESTIONS 1 & 2: 0
SUM OF ALL RESPONSES TO PHQ QUESTIONS 1-9: 0

## 2023-07-19 ASSESSMENT — ENCOUNTER SYMPTOMS
SHORTNESS OF BREATH: 0
COUGH: 0
CHEST TIGHTNESS: 0
CONSTIPATION: 0
ABDOMINAL PAIN: 0
ABDOMINAL DISTENTION: 0

## 2023-07-19 NOTE — ASSESSMENT & PLAN NOTE
Borderline controlled, continue current medications, medication adherence emphasized and lifestyle modifications recommended   .

## 2023-07-19 NOTE — ASSESSMENT & PLAN NOTE
Patient has been advised to be compliant with recommendations by her allergist, montelukast, Xyzal, and to reach out for the possibility of giving her the medications suggested, she has been advised to try to decrease the use of Zyrtec-D, and instead transition to Zyrtec, patient will contact again her allergist

## 2023-07-19 NOTE — PROGRESS NOTES
Mild intermittent asthma without complication  Assessment & Plan:   Borderline controlled, continue current medications, medication adherence emphasized and lifestyle modifications recommended   . Orders:  -     fluticasone-salmeterol (ADVAIR) 250-50 MCG/ACT AEPB diskus inhaler; Advair Diskus 250 mcg-50 mcg/dose powder for inhalation  Inhale 1 puff twice a day by inhalation route., Disp-60 each, R-1Normal  -     albuterol sulfate HFA (PROVENTIL;VENTOLIN;PROAIR) 108 (90 Base) MCG/ACT inhaler; ProAir HFA 90 mcg/actuation aerosol inhaler  Inhale 2 puffs every 4-6 hours by inhalation route as needed, Disp-3 each, R-1Adjust Sig      Return in about 6 months (around 1/19/2024) for annual.     Review of system:    Review of Systems   Constitutional:  Negative for activity change, chills, fatigue and fever. Respiratory:  Negative for cough, chest tightness and shortness of breath. Cardiovascular:  Negative for chest pain. Gastrointestinal:  Negative for abdominal distention, abdominal pain and constipation. Allergic/Immunologic: Positive for environmental allergies and food allergies. Neurological:  Negative for dizziness and headaches. Psychiatric/Behavioral:  The patient is not nervous/anxious.         Immunization history:    Immunization History   Administered Date(s) Administered    COVID-19, PFIZER PURPLE top, DILUTE for use, (age 15 y+), 30mcg/0.3mL 04/08/2021, 07/11/2021    Hep B, ENGERIX-B, (age 20y+), IM, 1mL 08/18/2004, 09/19/2004, 02/19/2005    Hepatitis B 08/18/2004, 03/10/2005    MMR, Juan A Graft, M-M-R II, (age 12m+), SC, 0.5mL 08/18/2004    TD 2LF, TDVAX, (age 7y+), IM, 0.5mL 08/18/2004       Current medications:      Current Outpatient Medications:     fluticasone-salmeterol (ADVAIR) 250-50 MCG/ACT AEPB diskus inhaler, Advair Diskus 250 mcg-50 mcg/dose powder for inhalation  Inhale 1 puff twice a day by inhalation route., Disp: 60 each, Rfl: 1    albuterol sulfate HFA (PROVENTIL;VENTOLIN;PROAIR)

## 2023-07-20 LAB
EST. AVERAGE GLUCOSE BLD GHB EST-MCNC: 114 MG/DL
HBA1C MFR BLD: 5.6 % (ref 4.8–5.6)

## 2023-07-26 DIAGNOSIS — J45.20 MILD INTERMITTENT ASTHMA WITHOUT COMPLICATION: ICD-10-CM

## 2023-07-26 RX ORDER — ALBUTEROL SULFATE 90 UG/1
AEROSOL, METERED RESPIRATORY (INHALATION)
Qty: 3 EACH | Refills: 1 | Status: SHIPPED | OUTPATIENT
Start: 2023-07-26

## 2023-07-26 NOTE — TELEPHONE ENCOUNTER
Requested Prescriptions     Pending Prescriptions Disp Refills    albuterol sulfate HFA (PROVENTIL;VENTOLIN;PROAIR) 108 (90 Base) MCG/ACT inhaler 3 each 1     Sig: ProAir HFA 90 mcg/actuation aerosol inhaler  Inhale 2 puffs every 4-6 hours by inhalation route as needed

## 2023-09-14 ENCOUNTER — OFFICE VISIT (OUTPATIENT)
Dept: INTERNAL MEDICINE CLINIC | Facility: CLINIC | Age: 43
End: 2023-09-14
Payer: COMMERCIAL

## 2023-09-14 VITALS
BODY MASS INDEX: 25.62 KG/M2 | OXYGEN SATURATION: 96 % | HEIGHT: 66 IN | SYSTOLIC BLOOD PRESSURE: 102 MMHG | WEIGHT: 159.4 LBS | HEART RATE: 71 BPM | DIASTOLIC BLOOD PRESSURE: 70 MMHG

## 2023-09-14 DIAGNOSIS — G44.89 OTHER HEADACHE SYNDROME: ICD-10-CM

## 2023-09-14 DIAGNOSIS — J30.89 NON-SEASONAL ALLERGIC RHINITIS DUE TO OTHER ALLERGIC TRIGGER: ICD-10-CM

## 2023-09-14 DIAGNOSIS — Z12.31 SCREENING MAMMOGRAM FOR BREAST CANCER: ICD-10-CM

## 2023-09-14 DIAGNOSIS — Z00.00 ENCOUNTER FOR WELL ADULT EXAM WITHOUT ABNORMAL FINDINGS: Primary | ICD-10-CM

## 2023-09-14 PROCEDURE — 99396 PREV VISIT EST AGE 40-64: CPT | Performed by: INTERNAL MEDICINE

## 2023-09-14 RX ORDER — MONTELUKAST SODIUM 10 MG/1
10 TABLET ORAL
COMMUNITY
Start: 2023-07-26 | End: 2023-09-14 | Stop reason: SDUPTHER

## 2023-09-14 RX ORDER — MONTELUKAST SODIUM 10 MG/1
10 TABLET ORAL NIGHTLY
Qty: 90 TABLET | Refills: 3 | Status: SHIPPED | OUTPATIENT
Start: 2023-09-14

## 2023-09-14 ASSESSMENT — ENCOUNTER SYMPTOMS
CONSTIPATION: 0
CHEST TIGHTNESS: 0
BLOOD IN STOOL: 0
BACK PAIN: 0
SINUS PAIN: 0
SORE THROAT: 0
COUGH: 0
CHOKING: 0
DIARRHEA: 0
ABDOMINAL DISTENTION: 0

## 2024-05-07 SDOH — ECONOMIC STABILITY: TRANSPORTATION INSECURITY
IN THE PAST 12 MONTHS, HAS LACK OF TRANSPORTATION KEPT YOU FROM MEETINGS, WORK, OR FROM GETTING THINGS NEEDED FOR DAILY LIVING?: NO

## 2024-05-07 SDOH — ECONOMIC STABILITY: HOUSING INSECURITY
IN THE LAST 12 MONTHS, WAS THERE A TIME WHEN YOU DID NOT HAVE A STEADY PLACE TO SLEEP OR SLEPT IN A SHELTER (INCLUDING NOW)?: NO

## 2024-05-07 SDOH — ECONOMIC STABILITY: FOOD INSECURITY: WITHIN THE PAST 12 MONTHS, THE FOOD YOU BOUGHT JUST DIDN'T LAST AND YOU DIDN'T HAVE MONEY TO GET MORE.: SOMETIMES TRUE

## 2024-05-07 SDOH — ECONOMIC STABILITY: INCOME INSECURITY: HOW HARD IS IT FOR YOU TO PAY FOR THE VERY BASICS LIKE FOOD, HOUSING, MEDICAL CARE, AND HEATING?: HARD

## 2024-05-07 SDOH — ECONOMIC STABILITY: FOOD INSECURITY: WITHIN THE PAST 12 MONTHS, YOU WORRIED THAT YOUR FOOD WOULD RUN OUT BEFORE YOU GOT MONEY TO BUY MORE.: SOMETIMES TRUE

## 2024-05-07 ASSESSMENT — PATIENT HEALTH QUESTIONNAIRE - PHQ9
SUM OF ALL RESPONSES TO PHQ QUESTIONS 1-9: 0
SUM OF ALL RESPONSES TO PHQ QUESTIONS 1-9: 0
2. FEELING DOWN, DEPRESSED OR HOPELESS: NOT AT ALL
SUM OF ALL RESPONSES TO PHQ9 QUESTIONS 1 & 2: 0
1. LITTLE INTEREST OR PLEASURE IN DOING THINGS: NOT AT ALL
SUM OF ALL RESPONSES TO PHQ9 QUESTIONS 1 & 2: 0
2. FEELING DOWN, DEPRESSED OR HOPELESS: NOT AT ALL
SUM OF ALL RESPONSES TO PHQ QUESTIONS 1-9: 0
1. LITTLE INTEREST OR PLEASURE IN DOING THINGS: NOT AT ALL
SUM OF ALL RESPONSES TO PHQ QUESTIONS 1-9: 0

## 2024-05-09 ENCOUNTER — OFFICE VISIT (OUTPATIENT)
Dept: INTERNAL MEDICINE CLINIC | Facility: CLINIC | Age: 44
End: 2024-05-09
Payer: COMMERCIAL

## 2024-05-09 VITALS
WEIGHT: 155 LBS | HEIGHT: 66 IN | BODY MASS INDEX: 24.91 KG/M2 | SYSTOLIC BLOOD PRESSURE: 130 MMHG | OXYGEN SATURATION: 98 % | DIASTOLIC BLOOD PRESSURE: 84 MMHG | HEART RATE: 69 BPM

## 2024-05-09 DIAGNOSIS — Z98.890 S/P ABDOMINOPLASTY: ICD-10-CM

## 2024-05-09 DIAGNOSIS — R87.610 PAP SMEAR ABNORMALITY OF CERVIX WITH ASCUS FAVORING BENIGN: ICD-10-CM

## 2024-05-09 DIAGNOSIS — E78.1 HYPERTRIGLYCERIDEMIA: ICD-10-CM

## 2024-05-09 DIAGNOSIS — R73.01 FASTING HYPERGLYCEMIA: Primary | ICD-10-CM

## 2024-05-09 PROCEDURE — 99213 OFFICE O/P EST LOW 20 MIN: CPT | Performed by: INTERNAL MEDICINE

## 2024-05-09 RX ORDER — ACETAMINOPHEN 500 MG
500 TABLET ORAL EVERY 6 HOURS PRN
COMMUNITY

## 2024-05-09 SDOH — ECONOMIC STABILITY: INCOME INSECURITY: HOW HARD IS IT FOR YOU TO PAY FOR THE VERY BASICS LIKE FOOD, HOUSING, MEDICAL CARE, AND HEATING?: HARD

## 2024-05-09 SDOH — ECONOMIC STABILITY: FOOD INSECURITY: WITHIN THE PAST 12 MONTHS, THE FOOD YOU BOUGHT JUST DIDN'T LAST AND YOU DIDN'T HAVE MONEY TO GET MORE.: SOMETIMES TRUE

## 2024-05-09 SDOH — ECONOMIC STABILITY: FOOD INSECURITY: WITHIN THE PAST 12 MONTHS, YOU WORRIED THAT YOUR FOOD WOULD RUN OUT BEFORE YOU GOT MONEY TO BUY MORE.: SOMETIMES TRUE

## 2024-05-09 ASSESSMENT — ENCOUNTER SYMPTOMS
COUGH: 0
ABDOMINAL DISTENTION: 1
CONSTIPATION: 0
CHEST TIGHTNESS: 0
ABDOMINAL PAIN: 1
SHORTNESS OF BREATH: 0

## 2024-05-09 NOTE — PROGRESS NOTES
Current Outpatient Medications:     acetaminophen (TYLENOL) 500 MG tablet, Take 1 tablet by mouth every 6 hours as needed for Pain, Disp: , Rfl:     montelukast (SINGULAIR) 10 MG tablet, Take 1 tablet by mouth nightly, Disp: 90 tablet, Rfl: 3    albuterol sulfate HFA (PROVENTIL;VENTOLIN;PROAIR) 108 (90 Base) MCG/ACT inhaler, ProAir HFA 90 mcg/actuation aerosol inhaler  Inhale 2 puffs every 4-6 hours by inhalation route as needed, Disp: 3 each, Rfl: 1    fluticasone-salmeterol (ADVAIR) 250-50 MCG/ACT AEPB diskus inhaler, Advair Diskus 250 mcg-50 mcg/dose powder for inhalation  Inhale 1 puff twice a day by inhalation route., Disp: 60 each, Rfl: 1    levonorgestrel (MIRENA, 52 MG,) IUD 52 mg, 1 each by IntraUTERine route once, Disp: , Rfl:     Family History   Problem Relation Age of Onset    Diabetes Father 34    Kidney Disease Father     Diabetes Mother     Breast Cancer Neg Hx     Colon Cancer Neg Hx     Ovarian Cancer Neg Hx         No past medical history on file.     Past Surgical History:   Procedure Laterality Date    ABDOMINOPLASTY  04/10/2024    BREAST ENHANCEMENT SURGERY  2022     SECTION      X3    COSMETIC SURGERY      neck        Failed to redirect to the Timeline version of the REVFS SmartLink.     IMMUNIZATIONS  Immunization History   Administered Date(s) Administered    COVID-19, PFIZER PURPLE top, DILUTE for use, (age 12 y+), 30mcg/0.3mL 2021, 2021    Hep B, ENGERIX-B, (age 20y+), IM, 1mL 2004, 2004, 2005    Hepatitis B 2004, 03/10/2005    MMR, PRIORIX, M-M-R II, (age 12m+), SC, 0.5mL 2004    TD 2LF, TDVAX, (age 7y+), IM, 0.5mL 2004            Health Maintenance Due   Topic Date Due    Pneumococcal 0-64 years Vaccine (1 of 2 - PCV) Never done    DTaP/Tdap/Td vaccine (1 - Tdap) 2004    COVID-19 Vaccine (3 - - season) 2023            /84 (Site: Left Upper Arm, Position: Sitting)   Pulse 69   Ht 1.676 m (5'

## 2024-07-01 DIAGNOSIS — J30.89 NON-SEASONAL ALLERGIC RHINITIS DUE TO OTHER ALLERGIC TRIGGER: ICD-10-CM

## 2024-07-01 RX ORDER — MONTELUKAST SODIUM 10 MG/1
10 TABLET ORAL NIGHTLY
Qty: 90 TABLET | Refills: 3 | Status: SHIPPED | OUTPATIENT
Start: 2024-07-01

## 2024-07-31 DIAGNOSIS — R73.01 FASTING HYPERGLYCEMIA: ICD-10-CM

## 2024-07-31 DIAGNOSIS — E78.1 HYPERTRIGLYCERIDEMIA: ICD-10-CM

## 2024-07-31 LAB
ALBUMIN SERPL-MCNC: 4.1 G/DL (ref 3.5–5)
ALBUMIN/GLOB SERPL: 1.4 (ref 1–1.9)
ALP SERPL-CCNC: 65 U/L (ref 35–104)
ALT SERPL-CCNC: 23 U/L (ref 12–65)
ANION GAP SERPL CALC-SCNC: 11 MMOL/L (ref 9–18)
AST SERPL-CCNC: 25 U/L (ref 15–37)
BASOPHILS # BLD: 0.1 K/UL (ref 0–0.2)
BASOPHILS NFR BLD: 1 % (ref 0–2)
BILIRUB SERPL-MCNC: 0.3 MG/DL (ref 0–1.2)
BUN SERPL-MCNC: 9 MG/DL (ref 6–23)
CALCIUM SERPL-MCNC: 9.7 MG/DL (ref 8.8–10.2)
CHLORIDE SERPL-SCNC: 101 MMOL/L (ref 98–107)
CHOLEST SERPL-MCNC: 215 MG/DL (ref 0–200)
CO2 SERPL-SCNC: 25 MMOL/L (ref 20–28)
CREAT SERPL-MCNC: 0.59 MG/DL (ref 0.6–1.1)
DIFFERENTIAL METHOD BLD: NORMAL
EOSINOPHIL # BLD: 0.2 K/UL (ref 0–0.8)
EOSINOPHIL NFR BLD: 3 % (ref 0.5–7.8)
ERYTHROCYTE [DISTWIDTH] IN BLOOD BY AUTOMATED COUNT: 12.4 % (ref 11.9–14.6)
EST. AVERAGE GLUCOSE BLD GHB EST-MCNC: 124 MG/DL
GLOBULIN SER CALC-MCNC: 2.9 G/DL (ref 2.3–3.5)
GLUCOSE SERPL-MCNC: 82 MG/DL (ref 70–99)
HBA1C MFR BLD: 6 % (ref 0–5.6)
HCT VFR BLD AUTO: 45.2 % (ref 35.8–46.3)
HDLC SERPL-MCNC: 75 MG/DL (ref 40–60)
HDLC SERPL: 2.9 (ref 0–5)
HGB BLD-MCNC: 14.8 G/DL (ref 11.7–15.4)
IMM GRANULOCYTES # BLD AUTO: 0 K/UL (ref 0–0.5)
IMM GRANULOCYTES NFR BLD AUTO: 0 % (ref 0–5)
LDLC SERPL CALC-MCNC: 105 MG/DL (ref 0–100)
LYMPHOCYTES # BLD: 2.8 K/UL (ref 0.5–4.6)
LYMPHOCYTES NFR BLD: 35 % (ref 13–44)
MCH RBC QN AUTO: 30.3 PG (ref 26.1–32.9)
MCHC RBC AUTO-ENTMCNC: 32.7 G/DL (ref 31.4–35)
MCV RBC AUTO: 92.6 FL (ref 82–102)
MONOCYTES # BLD: 0.5 K/UL (ref 0.1–1.3)
MONOCYTES NFR BLD: 6 % (ref 4–12)
NEUTS SEG # BLD: 4.4 K/UL (ref 1.7–8.2)
NEUTS SEG NFR BLD: 55 % (ref 43–78)
NRBC # BLD: 0 K/UL (ref 0–0.2)
PLATELET # BLD AUTO: 262 K/UL (ref 150–450)
PMV BLD AUTO: 12.3 FL (ref 9.4–12.3)
POTASSIUM SERPL-SCNC: 4.4 MMOL/L (ref 3.5–5.1)
PROT SERPL-MCNC: 7.1 G/DL (ref 6.3–8.2)
RBC # BLD AUTO: 4.88 M/UL (ref 4.05–5.2)
SODIUM SERPL-SCNC: 137 MMOL/L (ref 136–145)
TRIGL SERPL-MCNC: 177 MG/DL (ref 0–150)
TSH W FREE THYROID IF ABNORMAL: 1.95 UIU/ML (ref 0.27–4.2)
VLDLC SERPL CALC-MCNC: 35 MG/DL (ref 6–23)
WBC # BLD AUTO: 8 K/UL (ref 4.3–11.1)

## 2024-08-20 ENCOUNTER — OFFICE VISIT (OUTPATIENT)
Dept: OBGYN CLINIC | Age: 44
End: 2024-08-20
Payer: COMMERCIAL

## 2024-08-20 VITALS
WEIGHT: 151 LBS | HEIGHT: 66 IN | BODY MASS INDEX: 24.27 KG/M2 | SYSTOLIC BLOOD PRESSURE: 126 MMHG | DIASTOLIC BLOOD PRESSURE: 74 MMHG

## 2024-08-20 DIAGNOSIS — Z11.51 SCREENING FOR HPV (HUMAN PAPILLOMAVIRUS): ICD-10-CM

## 2024-08-20 DIAGNOSIS — Z01.419 WELL WOMAN EXAM WITH ROUTINE GYNECOLOGICAL EXAM: Primary | ICD-10-CM

## 2024-08-20 DIAGNOSIS — Z12.4 PAP SMEAR FOR CERVICAL CANCER SCREENING: ICD-10-CM

## 2024-08-20 PROCEDURE — 58301 REMOVE INTRAUTERINE DEVICE: CPT | Performed by: OBSTETRICS & GYNECOLOGY

## 2024-08-20 PROCEDURE — 99396 PREV VISIT EST AGE 40-64: CPT | Performed by: OBSTETRICS & GYNECOLOGY

## 2024-08-20 RX ORDER — ETONOGESTREL AND ETHINYL ESTRADIOL VAGINAL RING .015; .12 MG/D; MG/D
1 RING VAGINAL
Qty: 3 EACH | Refills: 3 | Status: SHIPPED | OUTPATIENT
Start: 2024-08-20

## 2024-08-20 NOTE — PROGRESS NOTES
vomiting.   Genitourinary: Negative for dysuria, frequency, hematuria and urgency.   Musculoskeletal: Negative for falls, joint pain and myalgias.   Skin: Negative for itching and rash.   Neurological: Negative for headaches.   Endo/Heme/Allergies: Does not bruise/bleed easily.   Psychiatric/Behavioral: Negative for depression and suicidal ideas. The patient is not nervous/anxious.   All other systems reviewed and are negative.    Physical Exam      Vitals:    08/20/24 0941   BP: 126/74        Constitutional: She appears well-developed and well-nourished. No distress.   HENT:    Head: Normocephalic and atraumatic.   Neck: Normal range of motion. No thyromegaly present.   Cardiovascular: Normal rate, regular rhythm and normal heart sounds. Exam reveals no gallop and no friction rub.    No murmur heard.  Pulmonary/Chest: Effort normal and breath sounds normal. No respiratory distress. She has no wheezes. She has no rales.   Abdominal: Soft. Bowel sounds are normal. She exhibits no distension and no mass. There is no tenderness. There is no rebound and no guarding.   Skin: She is not diaphoretic.   Psychiatric: She has a normal mood and affect. Her behavior is normal. Thought content normal. .    Pelvic:   External genitalia wnl, no lesions, rashes  Clitoris and urethra midline  Vagina pink, moist, well rugated  Cervix without lesion/masses, DC wnl, no CMT   Uterus normal in size and contour, no masses, NTTP  Adnexa without masses, NTTP    Breasts:   Symmetric, no lesions, masses, rashes, no abnl nipple Dc     Chaperone used for pelvic exam    IUD removal:   Above pelvic exam was performed. IUD strings were visible and grasped with a ringed forcep. IUD was removed in entirety.   Patient tolerated procedure well      Counseling:  Discussed General Recommendations (pts 40-65yo):  -Routine Pap  (unless cervix removed for benign reasons and not hx high grade dysplasia [ie VERONICA 2-3])  -Routine STD testing for high risk

## 2024-08-27 LAB
COLLECTION METHOD: ABNORMAL
CYTOLOGIST CVX/VAG CYTO: ABNORMAL
CYTOLOGY CVX/VAG DOC THIN PREP: ABNORMAL
DATE OF LMP: ABNORMAL
HPV APTIMA: NEGATIVE
HPV GENOTYPE REFLEX: ABNORMAL
Lab: ABNORMAL
OTHER PT INFO: ABNORMAL
PAP SOURCE: ABNORMAL
PATH REPORT.FINAL DX SPEC: ABNORMAL
PATHOLOGIST CVX/VAG CYTO: ABNORMAL
PATHOLOGIST PROVIDED ICD: ABNORMAL
PREV CYTO INFO: NEGATIVE
PREV TREATMENT RESULTS: ABNORMAL
PREV TREATMENT: ABNORMAL
RECOM F/U CVX/VAG CYTO: ABNORMAL
STAT OF ADQ CVX/VAG CYTO-IMP: ABNORMAL

## 2024-09-13 ENCOUNTER — OFFICE VISIT (OUTPATIENT)
Dept: INTERNAL MEDICINE CLINIC | Facility: CLINIC | Age: 44
End: 2024-09-13
Payer: COMMERCIAL

## 2024-09-13 VITALS
BODY MASS INDEX: 23.56 KG/M2 | OXYGEN SATURATION: 96 % | HEIGHT: 66 IN | SYSTOLIC BLOOD PRESSURE: 116 MMHG | DIASTOLIC BLOOD PRESSURE: 90 MMHG | HEART RATE: 96 BPM | WEIGHT: 146.6 LBS

## 2024-09-13 DIAGNOSIS — M79.671 FOOT PAIN, RIGHT: Primary | ICD-10-CM

## 2024-09-13 PROCEDURE — 99214 OFFICE O/P EST MOD 30 MIN: CPT | Performed by: INTERNAL MEDICINE

## 2024-09-13 ASSESSMENT — ENCOUNTER SYMPTOMS
ABDOMINAL PAIN: 0
CHEST TIGHTNESS: 0
COUGH: 0
CONSTIPATION: 0
SHORTNESS OF BREATH: 0
ABDOMINAL DISTENTION: 0

## 2024-10-16 ENCOUNTER — PROCEDURE VISIT (OUTPATIENT)
Dept: OBGYN CLINIC | Age: 44
End: 2024-10-16

## 2024-10-16 VITALS
SYSTOLIC BLOOD PRESSURE: 128 MMHG | BODY MASS INDEX: 23.46 KG/M2 | HEIGHT: 66 IN | DIASTOLIC BLOOD PRESSURE: 78 MMHG | WEIGHT: 146 LBS

## 2024-10-16 DIAGNOSIS — R87.619 ATYPICAL GLANDULAR CELLS OF UNDETERMINED SIGNIFICANCE (AGUS) ON CERVICAL PAP SMEAR: Primary | ICD-10-CM

## 2024-10-16 LAB
HCG, PREGNANCY, URINE, POC: NEGATIVE
VALID INTERNAL CONTROL, POC: YES

## 2024-10-16 NOTE — PROGRESS NOTES
observations:  Cervix visibility good/full visualization   SCJ: entire SCJ visualized   AW changes visualized at 9   Lesion color: light white, feathery borders  Size of lesion(s): < 50% of cervix  Vascular changes (ie punctation, mosaicism) no   Colposcopic impression: metaplasia   Candidate for office LEEP yes       Physical Exam    Endometrial Biopsy      10/16/2024     Name:  Hilaria Lopez    :  1980 Age:  44 y.o.      Contraceptive method:  none/condoms  LMP:  No LMP recorded.  Urine pregnancy test: neg    Reason for procedure:  AGC.    Procedure:  Speculum was inserted.  Cervix visualized and cleansed with betadine x3.  Single-toothed tenaculum was applied to the anterior lip.  Curette was inserted through the OS.  Tissue obtained was Moderate in amount.  Patient tolerated the procedure well.  Tissue sent to pathology.      Colposcopy procedure:      Consent signed and time out performed.  Patient positioned in lithotomy position.  A bivalve speculum was placed into the vagina.  The cervix was bathed in acetic acid.  Colposcopy observations were seen as above.  ECC was performed. Cervical biopsies were performed at  9  o' clock positions.  Silver Nitrate was applied for good hemostasis.  All instruments were removed from the vagina.  Patient tolerated procedure well.      Counseling:  Counseled pt on the results of her Pap smear, the meaning behind her results, what the purpose of today's colposcopy is, and our pursuit to prevent cervical cancer by routine screening/surveillance.      Patient counseled face to face for more than 50% of the total time spent with the patient.  Time=15 minutes      Assessment/Plan:  FU dependent on pathology results.  Pt will be called with results.      Specimens:   Endometrial curetting   Endocervical curetting   Ectocervical bx     Darcy Sheppard DO

## 2024-10-21 ENCOUNTER — OFFICE VISIT (OUTPATIENT)
Dept: INTERNAL MEDICINE CLINIC | Facility: CLINIC | Age: 44
End: 2024-10-21
Payer: COMMERCIAL

## 2024-10-21 VITALS
HEIGHT: 66 IN | DIASTOLIC BLOOD PRESSURE: 70 MMHG | SYSTOLIC BLOOD PRESSURE: 108 MMHG | WEIGHT: 145 LBS | BODY MASS INDEX: 23.3 KG/M2 | HEART RATE: 65 BPM | OXYGEN SATURATION: 98 %

## 2024-10-21 DIAGNOSIS — J45.20 MILD INTERMITTENT ASTHMA WITHOUT COMPLICATION: ICD-10-CM

## 2024-10-21 DIAGNOSIS — Z00.00 ANNUAL PHYSICAL EXAM: Primary | ICD-10-CM

## 2024-10-21 DIAGNOSIS — Z12.11 SCREEN FOR COLON CANCER: ICD-10-CM

## 2024-10-21 DIAGNOSIS — Z23 ENCOUNTER FOR VACCINATION: ICD-10-CM

## 2024-10-21 DIAGNOSIS — M79.671 FOOT PAIN, RIGHT: ICD-10-CM

## 2024-10-21 DIAGNOSIS — Z12.31 SCREENING MAMMOGRAM FOR BREAST CANCER: ICD-10-CM

## 2024-10-21 DIAGNOSIS — R73.01 FASTING HYPERGLYCEMIA: ICD-10-CM

## 2024-10-21 PROCEDURE — 99396 PREV VISIT EST AGE 40-64: CPT | Performed by: INTERNAL MEDICINE

## 2024-10-21 PROCEDURE — 90661 CCIIV3 VAC ABX FR 0.5 ML IM: CPT | Performed by: INTERNAL MEDICINE

## 2024-10-21 PROCEDURE — 90471 IMMUNIZATION ADMIN: CPT | Performed by: INTERNAL MEDICINE

## 2024-10-21 ASSESSMENT — ENCOUNTER SYMPTOMS
ABDOMINAL DISTENTION: 0
SHORTNESS OF BREATH: 0
CONSTIPATION: 0
COUGH: 0
CHEST TIGHTNESS: 0
ABDOMINAL PAIN: 0

## 2024-10-21 NOTE — PROGRESS NOTES
Chief Complaint   Patient presents with    Annual Exam     Ortho referral.         Hilaria Lopez is a 44 y.o. female who presents today for Annual Exam (Ortho referral. )     She is here for annual exam, currently up-to-date with Paps, she is awaiting results of her colposcopy, biopsy done recently by GYN, she is still having vaginal bleeding and some discomfort in the pelvic area,  Currently due for mammogram, and this year is due for colorectal screening test, she is not interested in colonoscopy at this time, but is considering Cologuard,  Up to date with an eye and dental examination  Exercise: Walking at least 6 miles per day,  Has been trying to make changes in her diet,  No changes in family history  Concerns today:   Plantar pain in R foot  Asthma has been well-controlled,    Wt Readings from Last 3 Encounters:   10/21/24 65.8 kg (145 lb)   10/16/24 66.2 kg (146 lb)   09/13/24 66.5 kg (146 lb 9.6 oz)         Assessment  And plan    1. Annual physical exam  -     CBC with Auto Differential; Future  -     Comprehensive Metabolic Panel; Future  -     Lipid Panel; Future  -     TSH with Reflex; Future  2. Foot pain, right  -     [facility] D[facility]  3. Screening mammogram for breast cancer  -     MARIAH CONCHA DIGITAL SCREEN BILATERAL; Future  4. Screen for colon cancer  -     Cologuard (Fecal DNA Colorectal Cancer Screening)  5. Encounter for vaccination  -     Influenza, FLUCELVAX Trivalent, (age 6 mo+) IM, Preservative Free, 0.5mL  6. Mild intermittent asthma without complication  Assessment & Plan:   Chronic, at goal (stable), continue current treatment plan  7. Fasting hyperglycemia  -     Hemoglobin A1C; Future    Discussed Primary prevention aims to avert the development of disease including Immunizations, life style modifications (smoking and alcohol  cessation, promoting physical activity: 150 minutes per week of moderate to high intensity

## 2024-12-31 LAB — NONINV COLON CA DNA+OCC BLD SCRN STL QL: NEGATIVE

## 2025-02-03 ENCOUNTER — PATIENT MESSAGE (OUTPATIENT)
Dept: INTERNAL MEDICINE CLINIC | Facility: CLINIC | Age: 45
End: 2025-02-03

## 2025-02-05 ENCOUNTER — OFFICE VISIT (OUTPATIENT)
Dept: INTERNAL MEDICINE CLINIC | Facility: CLINIC | Age: 45
End: 2025-02-05
Payer: COMMERCIAL

## 2025-02-05 ENCOUNTER — HOSPITAL ENCOUNTER (OUTPATIENT)
Dept: CT IMAGING | Age: 45
Discharge: HOME | End: 2025-02-07
Payer: COMMERCIAL

## 2025-02-05 VITALS
SYSTOLIC BLOOD PRESSURE: 110 MMHG | HEART RATE: 67 BPM | HEIGHT: 66 IN | OXYGEN SATURATION: 98 % | WEIGHT: 144 LBS | BODY MASS INDEX: 23.14 KG/M2 | DIASTOLIC BLOOD PRESSURE: 70 MMHG

## 2025-02-05 DIAGNOSIS — R10.84 GENERALIZED ABDOMINAL PAIN: ICD-10-CM

## 2025-02-05 DIAGNOSIS — R10.84 GENERALIZED ABDOMINAL PAIN: Primary | ICD-10-CM

## 2025-02-05 LAB
ALBUMIN SERPL-MCNC: 3.8 G/DL (ref 3.5–5)
ALBUMIN/GLOB SERPL: 1.1 (ref 1–1.9)
ALP SERPL-CCNC: 69 U/L (ref 35–104)
ALT SERPL-CCNC: 18 U/L (ref 8–45)
ANION GAP SERPL CALC-SCNC: 11 MMOL/L (ref 7–16)
AST SERPL-CCNC: 28 U/L (ref 15–37)
BASOPHILS # BLD: 0.05 K/UL (ref 0–0.2)
BASOPHILS NFR BLD: 0.6 % (ref 0–2)
BILIRUB SERPL-MCNC: <0.2 MG/DL (ref 0–1.2)
BUN SERPL-MCNC: 12 MG/DL (ref 6–23)
CALCIUM SERPL-MCNC: 9.6 MG/DL (ref 8.8–10.2)
CHLORIDE SERPL-SCNC: 102 MMOL/L (ref 98–107)
CO2 SERPL-SCNC: 26 MMOL/L (ref 20–29)
CREAT SERPL-MCNC: 0.67 MG/DL (ref 0.6–1.1)
DIFFERENTIAL METHOD BLD: NORMAL
EOSINOPHIL # BLD: 0.31 K/UL (ref 0–0.8)
EOSINOPHIL NFR BLD: 3.6 % (ref 0.5–7.8)
ERYTHROCYTE [DISTWIDTH] IN BLOOD BY AUTOMATED COUNT: 12.4 % (ref 11.9–14.6)
GLOBULIN SER CALC-MCNC: 3.4 G/DL (ref 2.3–3.5)
GLUCOSE SERPL-MCNC: 109 MG/DL (ref 70–99)
HCT VFR BLD AUTO: 42.4 % (ref 35.8–46.3)
HGB BLD-MCNC: 14.1 G/DL (ref 11.7–15.4)
IMM GRANULOCYTES # BLD AUTO: 0.02 K/UL (ref 0–0.5)
IMM GRANULOCYTES NFR BLD AUTO: 0.2 % (ref 0–5)
LYMPHOCYTES # BLD: 2.48 K/UL (ref 0.5–4.6)
LYMPHOCYTES NFR BLD: 28.5 % (ref 13–44)
MCH RBC QN AUTO: 31.3 PG (ref 26.1–32.9)
MCHC RBC AUTO-ENTMCNC: 33.3 G/DL (ref 31.4–35)
MCV RBC AUTO: 94.2 FL (ref 82–102)
MONOCYTES # BLD: 0.47 K/UL (ref 0.1–1.3)
MONOCYTES NFR BLD: 5.4 % (ref 4–12)
NEUTS SEG # BLD: 5.37 K/UL (ref 1.7–8.2)
NEUTS SEG NFR BLD: 61.7 % (ref 43–78)
NRBC # BLD: 0 K/UL (ref 0–0.2)
PLATELET # BLD AUTO: 277 K/UL (ref 150–450)
PMV BLD AUTO: 12 FL (ref 9.4–12.3)
POTASSIUM SERPL-SCNC: 3.8 MMOL/L (ref 3.5–5.1)
PROT SERPL-MCNC: 7.1 G/DL (ref 6.3–8.2)
RBC # BLD AUTO: 4.5 M/UL (ref 4.05–5.2)
SODIUM SERPL-SCNC: 139 MMOL/L (ref 136–145)
WBC # BLD AUTO: 8.7 K/UL (ref 4.3–11.1)

## 2025-02-05 PROCEDURE — 6360000004 HC RX CONTRAST MEDICATION: Performed by: INTERNAL MEDICINE

## 2025-02-05 PROCEDURE — 74177 CT ABD & PELVIS W/CONTRAST: CPT

## 2025-02-05 PROCEDURE — 99214 OFFICE O/P EST MOD 30 MIN: CPT | Performed by: INTERNAL MEDICINE

## 2025-02-05 RX ORDER — DIATRIZOATE MEGLUMINE AND DIATRIZOATE SODIUM 660; 100 MG/ML; MG/ML
15 SOLUTION ORAL; RECTAL
Status: DISCONTINUED | OUTPATIENT
Start: 2025-02-05 | End: 2025-02-08 | Stop reason: HOSPADM

## 2025-02-05 RX ORDER — IOPAMIDOL 755 MG/ML
100 INJECTION, SOLUTION INTRAVASCULAR
Status: COMPLETED | OUTPATIENT
Start: 2025-02-05 | End: 2025-02-05

## 2025-02-05 RX ADMIN — DIATRIZOATE MEGLUMINE AND DIATRIZOATE SODIUM 15 ML: 660; 100 LIQUID ORAL; RECTAL at 15:22

## 2025-02-05 RX ADMIN — IOPAMIDOL 100 ML: 755 INJECTION, SOLUTION INTRAVENOUS at 15:21

## 2025-02-05 SDOH — ECONOMIC STABILITY: FOOD INSECURITY: WITHIN THE PAST 12 MONTHS, YOU WORRIED THAT YOUR FOOD WOULD RUN OUT BEFORE YOU GOT MONEY TO BUY MORE.: NEVER TRUE

## 2025-02-05 SDOH — ECONOMIC STABILITY: FOOD INSECURITY: WITHIN THE PAST 12 MONTHS, THE FOOD YOU BOUGHT JUST DIDN'T LAST AND YOU DIDN'T HAVE MONEY TO GET MORE.: NEVER TRUE

## 2025-02-05 ASSESSMENT — PATIENT HEALTH QUESTIONNAIRE - PHQ9
1. LITTLE INTEREST OR PLEASURE IN DOING THINGS: NOT AT ALL
SUM OF ALL RESPONSES TO PHQ QUESTIONS 1-9: 0
SUM OF ALL RESPONSES TO PHQ9 QUESTIONS 1 & 2: 0
2. FEELING DOWN, DEPRESSED OR HOPELESS: NOT AT ALL

## 2025-02-05 ASSESSMENT — ENCOUNTER SYMPTOMS
DIARRHEA: 1
ABDOMINAL PAIN: 1
NAUSEA: 0
CHEST TIGHTNESS: 0
COUGH: 0
SHORTNESS OF BREATH: 0
ABDOMINAL DISTENTION: 1
CONSTIPATION: 0

## 2025-02-05 NOTE — PROGRESS NOTES
Chief Complaint   Patient presents with    Abdominal Pain     On going one month, bloating, cramping.         Hilaria Lopez is a 44 y.o. female who presents today for Abdominal Pain (On going one month, bloating, cramping. )  She is here with concerns of diarrhea, and abdominal pain that is started in January 9, initially she thought was associated with anxiety as she was traveling to [place] but while she was there, her symptoms got worse, multiple food intolerances she was seen by providers given multiple medications including antiparasitic like albendazole, medications for gas, antibiotic therapy, but symptoms continued,  She denies any previous symptoms like this, she has not had a colonoscopy but Cologuard  She has tried changing her diet, but unfortunately has not been able to go back to her regular diet, has tried bread,  and other foods, but her diarrhea restart, associated with pain, in the upper abdomen, and radiating to the left lower abdomen,        Wt Readings from Last 3 Encounters:   02/05/25 65.3 kg (144 lb)   10/21/24 65.8 kg (145 lb)   10/16/24 66.2 kg (146 lb)     Vitals:    02/05/25 1218   BP: 110/70   Site: Left Upper Arm   Position: Sitting   Pulse: 67   SpO2: 98%   Weight: 65.3 kg (144 lb)   Height: 1.676 m (5' 6\")        Assessment and plan:  1. Generalized abdominal pain  -     CT ABDOMEN PELVIS W WO CONTRAST Additional Contrast? Radiologist Recommendation; Future  -     CBC with Auto Differential; Future  -     Comprehensive Metabolic Panel; Future  We discussed about possible causes of abdominal pain, including colitis, diverticulitis, gastroenteritis, because of persistent of her symptoms for  almost a month, with attempt to treat with antibiotics, and antiparasitic's without significant improvement, I am suggesting to rule out diverticulitis and perforation, her abdomen seems distended, sensitive to touch, patient will also will get information in regards

## 2025-02-10 RX ORDER — DICYCLOMINE HYDROCHLORIDE 10 MG/1
10 CAPSULE ORAL
Qty: 60 CAPSULE | Refills: 0 | Status: SHIPPED | OUTPATIENT
Start: 2025-02-10

## 2025-02-10 NOTE — PROGRESS NOTES
If her symptoms continued, we may try Bentyl 10 mg before each meal, and see if this medication helps with some of the symptoms, but if any worsening, we may ask GI for evaluation,

## 2025-02-10 NOTE — RESULT ENCOUNTER NOTE
Please let her know the results of her CT scan, showing no significant abnormalities, also her labs, liver, kidney function test and blood count are normal, the CT scan is showing also enlarged uterus, with possible fibroids, if symptomatic no heavy bleeding, no need to make changes in plan, but if any pelvic pain, or vaginal bleeding in excess, may need to be seen by GYN.  Please asked how she is currently doing in regards her acute symptoms

## 2025-02-26 ENCOUNTER — OFFICE VISIT (OUTPATIENT)
Dept: OBGYN CLINIC | Age: 45
End: 2025-02-26
Payer: COMMERCIAL

## 2025-02-26 ENCOUNTER — PROCEDURE VISIT (OUTPATIENT)
Dept: OBGYN CLINIC | Age: 45
End: 2025-02-26
Payer: COMMERCIAL

## 2025-02-26 VITALS
DIASTOLIC BLOOD PRESSURE: 80 MMHG | WEIGHT: 149 LBS | SYSTOLIC BLOOD PRESSURE: 122 MMHG | BODY MASS INDEX: 23.95 KG/M2 | HEIGHT: 66 IN

## 2025-02-26 DIAGNOSIS — D21.9 FIBROIDS: Primary | ICD-10-CM

## 2025-02-26 DIAGNOSIS — D25.9 UTERINE LEIOMYOMA, UNSPECIFIED LOCATION: Primary | ICD-10-CM

## 2025-02-26 PROCEDURE — 76830 TRANSVAGINAL US NON-OB: CPT | Performed by: OBSTETRICS & GYNECOLOGY

## 2025-02-26 PROCEDURE — 99214 OFFICE O/P EST MOD 30 MIN: CPT | Performed by: OBSTETRICS & GYNECOLOGY

## 2025-02-26 NOTE — PROGRESS NOTES
Result visit:       Hilaria Lopez is her today to discuss US results which was performed for fibroids and abdominal pain found on CT scan.    Of note, patient with AGC. S/p EMB, ECC, and Colpo with below pathology:  FINAL PATHOLOGIC DIAGNOSIS        A:  \"Endometrial biopsy\":  Benign weakly proliferative endometrium.        B:  \"ECC\":  Benign endocervical mucosa.        C:  \"Cervix 9 o'clock\":  Acute and chronic inflammation; no dysplasia identified.      Previously has Mirena IUD. Removed 2024 due to patient preference and started on Nuvaring instead. Never started Nuvaring.     Regular cycles.     Vitals:    25 1115   BP: 122/80        Results/imagin cc uterus, thin regular EMS 4 mm 3 fibroids (largest 3 cm and subserosal)  Normal bilateral ovaries     A/P:   Uterine fibroids  Discussed fibroids and that usually they do not enlarge after menopause  3 very small fibroids and location unlikely to cause AUB  Monitor sx and continue annual exams. If worsening sx can re-image, but for now no indication for surgery and/or treatment unless AUB occurs     Ipad used for Welsh interpretor       Darcy Sheppard DO

## 2025-04-09 ENCOUNTER — PREP FOR PROCEDURE (OUTPATIENT)
Age: 45
End: 2025-04-09

## 2025-04-09 ENCOUNTER — TELEPHONE (OUTPATIENT)
Age: 45
End: 2025-04-09

## 2025-04-09 ENCOUNTER — OFFICE VISIT (OUTPATIENT)
Age: 45
End: 2025-04-09
Payer: COMMERCIAL

## 2025-04-09 VITALS
BODY MASS INDEX: 24.33 KG/M2 | WEIGHT: 151.4 LBS | OXYGEN SATURATION: 96 % | HEIGHT: 66 IN | DIASTOLIC BLOOD PRESSURE: 80 MMHG | HEART RATE: 73 BPM | SYSTOLIC BLOOD PRESSURE: 120 MMHG | TEMPERATURE: 97.9 F | RESPIRATION RATE: 12 BRPM

## 2025-04-09 DIAGNOSIS — R10.13 EPIGASTRIC PAIN: Primary | ICD-10-CM

## 2025-04-09 DIAGNOSIS — R19.7 DIARRHEA, UNSPECIFIED TYPE: ICD-10-CM

## 2025-04-09 DIAGNOSIS — R14.0 ABDOMINAL BLOATING: ICD-10-CM

## 2025-04-09 DIAGNOSIS — R10.13 EPIGASTRIC PAIN: ICD-10-CM

## 2025-04-09 PROCEDURE — 99204 OFFICE O/P NEW MOD 45 MIN: CPT | Performed by: PHYSICIAN ASSISTANT

## 2025-04-09 RX ORDER — SODIUM CHLORIDE 9 MG/ML
25 INJECTION, SOLUTION INTRAVENOUS PRN
Status: CANCELLED | OUTPATIENT
Start: 2025-04-09

## 2025-04-09 RX ORDER — SODIUM CHLORIDE 0.9 % (FLUSH) 0.9 %
5-40 SYRINGE (ML) INJECTION PRN
Status: CANCELLED | OUTPATIENT
Start: 2025-04-09

## 2025-04-09 RX ORDER — SODIUM CHLORIDE 0.9 % (FLUSH) 0.9 %
5-40 SYRINGE (ML) INJECTION EVERY 12 HOURS SCHEDULED
Status: CANCELLED | OUTPATIENT
Start: 2025-04-09

## 2025-04-09 NOTE — TELEPHONE ENCOUNTER
Patient in office with , scheduled for colonoscopy and EGD with Dr. Kern on Monday 5/12/2025 at [facility]. Miralax prep instructions given to the patient in office in Ivorian.     Items to purchase at pharmacy 5 days before your procedure:  Dulcolax tablets-- (LAXATIVE ONLY- not stool softener)  238-gram bottle of MiraLAX  64 ounces Gatorade, Crystal Light, Apple juice (NO RED BLUE OR PURPLE IN COLOR)    The day before your procedure: Sunday 5/11/2025  Clear liquids only the entire day (water, coffee, tea, Sprite, 7-up, ginger ale, Jell-O, popsicles, Chicken/beef broth, apple juice, Gatorade) NO MILK, CREAMER, OR DAIRY PRODUCTS. NO SOLID FOODS. NOTHING RED, BLUE, OR PURPLE IN COLOR.  Mix Gatorade and MiraLAX together, shake the solution until the MiraLAX is dissolved. Chill before drinking.   Start at 5:00 pm Start drinking MiraLAX solution. Drink 8-ounce glass every 30 minutes. Until half gone. Put remainder in refrigerator.  At 5:00 pm take 2x Dulcolax tablets with 16 oz of water    NO MORE LIQUIDS AFTER MIDNIGHT (other than 2nd part of MiraLAX solution)    The day of your procedure: Monday 5/12/2025     6 HOURS PRIOR TO PROCEDURE: Drink the other half of your MiraLAX solution until gone.    NO MORE LIQUIDS UNTIL AFTER PROCEDURE

## 2025-04-22 DIAGNOSIS — R19.7 DIARRHEA, UNSPECIFIED TYPE: ICD-10-CM

## 2025-04-22 DIAGNOSIS — R10.13 EPIGASTRIC PAIN: ICD-10-CM

## 2025-04-22 DIAGNOSIS — R14.0 ABDOMINAL BLOATING: ICD-10-CM

## 2025-04-23 LAB — UREA BREATH TEST QL: NEGATIVE

## 2025-04-25 LAB
BACTERIA SPEC CULT: NORMAL
O+P SPEC MICRO: NORMAL
O+P STL CONC: NORMAL
SERVICE CMNT-IMP: NORMAL
SPECIMEN SOURCE: NORMAL

## 2025-04-29 ENCOUNTER — RESULTS FOLLOW-UP (OUTPATIENT)
Age: 45
End: 2025-04-29

## 2025-04-30 RX ORDER — VITAMIN B COMPLEX
1 CAPSULE ORAL DAILY
COMMUNITY

## 2025-04-30 NOTE — TELEPHONE ENCOUNTER
----- Message from Melissa Grinnell, PA-C sent at 4/29/2025 12:38 PM EDT -----  H. pylori testing was negative.  Stool culture as well as ova and parasite testing were both negative.

## 2025-04-30 NOTE — TELEPHONE ENCOUNTER
Spoke with pt, gave message from provider regarding studies. H. pylori testing was negative. Stool culture as well as ova and parasite testing were both negative. Pt verbalized understanding with no other questions or concerns.

## 2025-04-30 NOTE — CONSULTS
Session ID: [id number]  Language: Luxembourgish   ID: #[id number]   Name: Otoniel         Patient verified name, , and procedure.    Type: 1a; abbreviated assessment per anesthesia guidelines    Labs per anesthesia: none    Instructed pt that they will be notified the day before their procedure by the GI Lab for time of arrival if their procedure is [place] and Pre-op for [place] cases. Arrival times should be called by 5 pm. If no phone is received the patient should contact their respective hospital. The GI lab telephone number is [phone] and ES Pre-op is [phone].     Follow diet and prep instructions per office. Nothing to eat or drink after midnight (this excludes prep).      Bath or shower the night before and the am of surgery with non-moisturizing soap. No lotions, oils, powders, cologne on skin. No make up, eye make up or jewelry. Wear loose fitting comfortable, clean clothing.     Must have adult present in building the entire time .     Medications for the day of procedure Montelukast (Singulair), Albuterol (Ventolin/ Pro-air) use and bring , patient to hold Vitamins and supplements 7 days PTS, Hold NSAIDS 5 days PTS per anesthesia guidelines.     The following discharge instructions reviewed with patient: medication given during procedure may cause drowsiness for several hours, therefore, do not drive or operate machinery for remainder of the day. You may not drink alcohol on the day of your procedure, please resume regular diet and activity unless otherwise directed. You may experience abdominal distention for several hours that is relieved by the passage of gas. Contact your physician if you have any of the following: fever or chills, severe abdominal pain or excessive amount of bleeding or a large amount when having a bowel movement. Occasional specks of blood with bowel movement would not be unusual.

## 2025-05-09 NOTE — PROGRESS NOTES
RN called Pt to confirm appointment time of 1130, arrival time 1000, location,  requirement, and instructions for registration at the hospital.  Pt verbalized understanding using an .

## 2025-05-11 ENCOUNTER — ANESTHESIA EVENT (OUTPATIENT)
Dept: ENDOSCOPY | Age: 45
End: 2025-05-11
Payer: COMMERCIAL

## 2025-05-11 RX ORDER — SODIUM CHLORIDE, SODIUM LACTATE, POTASSIUM CHLORIDE, CALCIUM CHLORIDE 600; 310; 30; 20 MG/100ML; MG/100ML; MG/100ML; MG/100ML
INJECTION, SOLUTION INTRAVENOUS CONTINUOUS
Status: CANCELLED | OUTPATIENT
Start: 2025-05-11

## 2025-05-11 RX ORDER — PROCHLORPERAZINE EDISYLATE 5 MG/ML
5 INJECTION INTRAMUSCULAR; INTRAVENOUS
Status: CANCELLED | OUTPATIENT
Start: 2025-05-11

## 2025-05-11 RX ORDER — NALOXONE HYDROCHLORIDE 0.4 MG/ML
INJECTION, SOLUTION INTRAMUSCULAR; INTRAVENOUS; SUBCUTANEOUS PRN
Status: CANCELLED | OUTPATIENT
Start: 2025-05-11

## 2025-05-11 RX ORDER — ONDANSETRON 2 MG/ML
4 INJECTION INTRAMUSCULAR; INTRAVENOUS
Status: CANCELLED | OUTPATIENT
Start: 2025-05-11

## 2025-05-12 ENCOUNTER — HOSPITAL ENCOUNTER (OUTPATIENT)
Age: 45
Discharge: HOME | End: 2025-05-12
Attending: INTERNAL MEDICINE | Admitting: INTERNAL MEDICINE
Payer: COMMERCIAL

## 2025-05-12 ENCOUNTER — ANESTHESIA (OUTPATIENT)
Dept: ENDOSCOPY | Age: 45
End: 2025-05-12
Payer: COMMERCIAL

## 2025-05-12 VITALS
TEMPERATURE: 97.8 F | BODY MASS INDEX: 25.61 KG/M2 | HEIGHT: 64 IN | DIASTOLIC BLOOD PRESSURE: 61 MMHG | WEIGHT: 150 LBS | SYSTOLIC BLOOD PRESSURE: 116 MMHG | RESPIRATION RATE: 15 BRPM | OXYGEN SATURATION: 98 % | HEART RATE: 57 BPM

## 2025-05-12 DIAGNOSIS — R19.7 DIARRHEA, UNSPECIFIED TYPE: ICD-10-CM

## 2025-05-12 DIAGNOSIS — R14.0 ABDOMINAL BLOATING: ICD-10-CM

## 2025-05-12 DIAGNOSIS — R10.13 EPIGASTRIC PAIN: ICD-10-CM

## 2025-05-12 PROCEDURE — 3609010300 HC COLONOSCOPY W/BIOPSY SINGLE/MULTIPLE: Performed by: INTERNAL MEDICINE

## 2025-05-12 PROCEDURE — 7100000011 HC PHASE II RECOVERY - ADDTL 15 MIN: Performed by: INTERNAL MEDICINE

## 2025-05-12 PROCEDURE — 3609012400 HC EGD TRANSORAL BIOPSY SINGLE/MULTIPLE: Performed by: INTERNAL MEDICINE

## 2025-05-12 PROCEDURE — 3700000001 HC ADD 15 MINUTES (ANESTHESIA): Performed by: INTERNAL MEDICINE

## 2025-05-12 PROCEDURE — 6360000002 HC RX W HCPCS: Performed by: NURSE ANESTHETIST, CERTIFIED REGISTERED

## 2025-05-12 PROCEDURE — 3700000000 HC ANESTHESIA ATTENDED CARE: Performed by: INTERNAL MEDICINE

## 2025-05-12 PROCEDURE — 2709999900 HC NON-CHARGEABLE SUPPLY: Performed by: INTERNAL MEDICINE

## 2025-05-12 PROCEDURE — 2580000003 HC RX 258: Performed by: ANESTHESIOLOGY

## 2025-05-12 PROCEDURE — 88305 TISSUE EXAM BY PATHOLOGIST: CPT

## 2025-05-12 PROCEDURE — 7100000010 HC PHASE II RECOVERY - FIRST 15 MIN: Performed by: INTERNAL MEDICINE

## 2025-05-12 RX ORDER — SODIUM CHLORIDE 0.9 % (FLUSH) 0.9 %
5-40 SYRINGE (ML) INJECTION PRN
Status: DISCONTINUED | OUTPATIENT
Start: 2025-05-12 | End: 2025-05-12 | Stop reason: HOSPADM

## 2025-05-12 RX ORDER — LIDOCAINE HYDROCHLORIDE 20 MG/ML
INJECTION, SOLUTION EPIDURAL; INFILTRATION; INTRACAUDAL; PERINEURAL
Status: DISCONTINUED | OUTPATIENT
Start: 2025-05-12 | End: 2025-05-12 | Stop reason: SDUPTHER

## 2025-05-12 RX ORDER — PROPOFOL 10 MG/ML
INJECTION, EMULSION INTRAVENOUS
Status: DISCONTINUED | OUTPATIENT
Start: 2025-05-12 | End: 2025-05-12 | Stop reason: SDUPTHER

## 2025-05-12 RX ORDER — SODIUM CHLORIDE 0.9 % (FLUSH) 0.9 %
5-40 SYRINGE (ML) INJECTION EVERY 12 HOURS SCHEDULED
Status: DISCONTINUED | OUTPATIENT
Start: 2025-05-12 | End: 2025-05-12 | Stop reason: HOSPADM

## 2025-05-12 RX ORDER — SODIUM CHLORIDE 9 MG/ML
25 INJECTION, SOLUTION INTRAVENOUS PRN
Status: DISCONTINUED | OUTPATIENT
Start: 2025-05-12 | End: 2025-05-12 | Stop reason: HOSPADM

## 2025-05-12 RX ORDER — GLYCOPYRROLATE 0.2 MG/ML
INJECTION INTRAMUSCULAR; INTRAVENOUS
Status: DISCONTINUED | OUTPATIENT
Start: 2025-05-12 | End: 2025-05-12 | Stop reason: SDUPTHER

## 2025-05-12 RX ORDER — SODIUM CHLORIDE 9 MG/ML
INJECTION, SOLUTION INTRAVENOUS PRN
Status: DISCONTINUED | OUTPATIENT
Start: 2025-05-12 | End: 2025-05-12 | Stop reason: HOSPADM

## 2025-05-12 RX ORDER — LIDOCAINE HYDROCHLORIDE 10 MG/ML
1 INJECTION, SOLUTION INFILTRATION; PERINEURAL
Status: DISCONTINUED | OUTPATIENT
Start: 2025-05-12 | End: 2025-05-12 | Stop reason: HOSPADM

## 2025-05-12 RX ORDER — ONDANSETRON 2 MG/ML
4 INJECTION INTRAMUSCULAR; INTRAVENOUS
Status: DISCONTINUED | OUTPATIENT
Start: 2025-05-12 | End: 2025-05-12 | Stop reason: HOSPADM

## 2025-05-12 RX ORDER — SODIUM CHLORIDE, SODIUM LACTATE, POTASSIUM CHLORIDE, CALCIUM CHLORIDE 600; 310; 30; 20 MG/100ML; MG/100ML; MG/100ML; MG/100ML
INJECTION, SOLUTION INTRAVENOUS CONTINUOUS
Status: DISCONTINUED | OUTPATIENT
Start: 2025-05-12 | End: 2025-05-12 | Stop reason: HOSPADM

## 2025-05-12 RX ORDER — ONDANSETRON 2 MG/ML
INJECTION INTRAMUSCULAR; INTRAVENOUS
Status: DISCONTINUED | OUTPATIENT
Start: 2025-05-12 | End: 2025-05-12 | Stop reason: SDUPTHER

## 2025-05-12 RX ADMIN — SODIUM CHLORIDE, SODIUM LACTATE, POTASSIUM CHLORIDE, AND CALCIUM CHLORIDE: .6; .31; .03; .02 INJECTION, SOLUTION INTRAVENOUS at 10:35

## 2025-05-12 RX ADMIN — GLYCOPYRROLATE 0.2 MG: 0.2 INJECTION INTRAMUSCULAR; INTRAVENOUS at 11:26

## 2025-05-12 RX ADMIN — PROPOFOL 40 MG: 10 INJECTION, EMULSION INTRAVENOUS at 11:29

## 2025-05-12 RX ADMIN — LIDOCAINE HYDROCHLORIDE 100 MG: 20 INJECTION, SOLUTION EPIDURAL; INFILTRATION; INTRACAUDAL; PERINEURAL at 11:26

## 2025-05-12 RX ADMIN — ONDANSETRON 4 MG: 2 INJECTION INTRAMUSCULAR; INTRAVENOUS at 11:26

## 2025-05-12 RX ADMIN — PROPOFOL 160 MCG/KG/MIN: 10 INJECTION, EMULSION INTRAVENOUS at 11:26

## 2025-05-12 RX ADMIN — PROPOFOL 60 MG: 10 INJECTION, EMULSION INTRAVENOUS at 11:27

## 2025-05-12 RX ADMIN — PROPOFOL 30 MG: 10 INJECTION, EMULSION INTRAVENOUS at 11:42

## 2025-05-12 ASSESSMENT — PAIN - FUNCTIONAL ASSESSMENT
PAIN_FUNCTIONAL_ASSESSMENT: NONE - DENIES PAIN

## 2025-05-12 NOTE — DISCHARGE INSTRUCTIONS
Gastrointestinal Esophagogastroduodenoscopy (EGD) and Colonoscopy- Discharge Instructions    1. Call Dr. Kern   at  for any problems or questions.    2. Contact the doctor's office for follow up appointment as directed.    3. Medication may cause drowsiness for several hours, therefore, do not drive or operate machinery for remainder of the day.    4. No alcohol today.    5. Do not make any important decisions such as signing legal paperwork.    6. Ordinarily, you may resume regular diet and activity after exam unless otherwise specified by your physician.    7. For mild soreness in your throat you may use Cepacol throat lozenges or warm  salt-water gargles as needed.    8. Because of air put into your colon during exam, you may experience some abdominal distension, relieved by the passage of gas, for several hours.    9. Contact your physician if you have any of the following:  Excessive amount of bleeding - large amount when having a bowel movement.  Occasional specks of blood with bowel movement would not be unusual.  Severe abdominal pain  Fever or Chills    10. Polyp Removal - follow these additional instructions  No Aspirin, Advil, Aleve, Nuprin, Ibuprofen, or medications that contain these drugs for 2 weeks, unless taken for a heart condition.    Any additional instructions:   Await pathology results. Return to GI clinic at appointment to be scheduled. Patient has a contact number available for emergencies. The signs and symptoms of potential delayed complications were discussed with the patient. Return to normal activities tomorrow. Written discharge instructions were provided to the patient.  Await pathology results. Repeat colonoscopy in 10 years for screening purposes. Patient has a contact number available for emergencies. The signs and symptoms of potential delayed complications were discussed with the patient. Return to normal activities tomorrow. Written discharge instructions were

## 2025-05-12 NOTE — PROGRESS NOTES
Patient/caregivers speak Latvian  as their preferred language for their healthcare communication. For safe communication, use the [facility]  carts or call:    Senior /Navigator Galilea Gould at [phone] or   [employer] phone services for [facility] at [phone]    General phone: [email or web address] ( [phone])  Email: [email or web address]    Always document the use of interpreting services ('s ID number) in your clinical notes.    Our interpreters are available for team members working with limited  English proficient (LEP) patients remotely, via phone or video or in person (if needed for special cases).    When using family members to interpret, for the safety of the patient and protection of the communication of both our patient and [facility] staff the VRI or telephonic  should remain on the line to monitor that all communication is accurate and complete. The  should be instructed to notify [facility] staff immediately if there are any inaccuracies.         Thank you,        Galilea COOK  Senior /Navigator

## 2025-05-12 NOTE — ANESTHESIA POSTPROCEDURE EVALUATION
Department of Anesthesiology  Postprocedure Note    Patient: Hilaria Lopez  MRN: [id number]  YOB: 1980  Date of evaluation: 5/12/2025    Procedure Summary       Date: 05/12/25 Room / Location: [facility] ENDO 03 / [facility] ENDOSCOPY    Anesthesia Start: 1122 Anesthesia Stop: 1154    Procedures:       COLONOSCOPY BIOPSY      ESOPHAGOGASTRODUODENOSCOPY BIOPSY (Upper GI Region) Diagnosis:       Epigastric pain      Abdominal bloating      Diarrhea, unspecified type      (Epigastric pain [R10.13])      (Abdominal bloating [R14.0])      (Diarrhea, unspecified type [R19.7])    Surgeons: Marcella Kern MD Responsible Provider: Luis Colunga MD    Anesthesia Type: TIVA ASA Status: 2            Anesthesia Type: No value filed.    Jerald Phase I: Jerald Score: 10    Jerald Phase II: Jerald Score: 10    Anesthesia Post Evaluation    Patient location during evaluation: PACU  Patient participation: complete - patient participated  Level of consciousness: sleepy but conscious  Airway patency: patent  Nausea & Vomiting: no nausea and no vomiting  Cardiovascular status: blood pressure returned to baseline and hemodynamically stable  Respiratory status: acceptable  Hydration status: euvolemic  There was medical reason for not using a multimodal analgesia pain management approach.Pain management: adequate    No notable events documented.

## 2025-05-12 NOTE — H&P
GASTROENTEROLOGY H&P    Hilaria Lopez is 44 y.o. y/o female here for epigastric pain, abdominal bloating, diarrhea.        Past Medical History:   Diagnosis Date    Abnormal Pap smear of cervix     Asthma      Past Surgical History:   Procedure Laterality Date    ABDOMINOPLASTY  04/10/2024    BREAST ENHANCEMENT SURGERY  2022     SECTION      X3    COSMETIC SURGERY      neck    INDUCED        Family History   Problem Relation Age of Onset    Diabetes Father 34    Kidney Disease Father     Diabetes Mother     Breast Cancer Neg Hx     Colon Cancer Neg Hx     Ovarian Cancer Neg Hx      Social History     Tobacco Use    Smoking status: Never    Smokeless tobacco: Never   Vaping Use    Vaping status: Never Used   Substance Use Topics    Alcohol use: Never    Drug use: Never         PE:   Vitals:    25 1029   BP: 128/72   Pulse: 66   Resp: 16   Temp: 97.8 °F (36.6 °C)   SpO2: 98%      General:  The patient appears well-nourished, and is in no acute distress.    HEENT:  Normocephalic, atraumatic. No sclerae icterus.   Respiratory: Respiratory effort is normal.     Cardiovascular:  Regular rate and rhythm.     Abdomen:  Soft, non tender to palpation. No distention.     Assessment and Plan:   1. Epigastric pain  2. Abdominal bloating  3. Diarrhea, unspecified type    Proceed with EGD/colonoscopy. Further recommendations to follow procedure.       Marcella Kern MD  [facility] Gastroenterology

## 2025-05-12 NOTE — ANESTHESIA PRE PROCEDURE
Department of Anesthesiology  Preprocedure Note       Name:  Hilaria Lopez   Age:  44 y.o.  :  1980                                          MRN:  [id number]         Date:  2025      Surgeon: Surgeon(s):  Marcella Kern MD    Procedure: Procedure(s):  COLORECTAL CANCER SCREENING, NOT HIGH RISK  ESOPHAGOGASTRODUODENOSCOPY    Medications prior to admission:   Prior to Admission medications    Medication Sig Start Date End Date Taking? Authorizing Provider   Biotin 5000 MCG CAPS Take 1 capsule by mouth daily   Yes ProviderEliel MD   b complex vitamins capsule Take 1 capsule by mouth daily   Yes Provider, MD Eliel   montelukast (SINGULAIR) 10 MG tablet Take 1 tablet by mouth nightly  Patient taking differently: Take 1 tablet by mouth daily 24  Yes Lexis Beard MD   acetaminophen (TYLENOL) 500 MG tablet Take 1 tablet by mouth every 6 hours as needed for Pain   Yes ProviderEliel MD   albuterol sulfate HFA (PROVENTIL;VENTOLIN;PROAIR) 108 (90 Base) MCG/ACT inhaler ProAir HFA 90 mcg/actuation aerosol inhaler  Inhale 2 puffs every 4-6 hours by inhalation route as needed 23  Yes Lexis Beard MD   dicyclomine (BENTYL) 10 MG capsule Take 1 capsule by mouth 4 times daily (before meals and nightly)  Patient not taking: Reported on 2025 2/10/25   Lexis Beard MD   diclofenac (VOLTAREN) 50 MG EC tablet Take 1 tablet by mouth 3 times daily as needed for Pain  Patient not taking: Reported on 2025   Lexis Beard MD   fluticasone-salmeterol (ADVAIR) 250-50 MCG/ACT AEPB diskus inhaler Advair Diskus 250 mcg-50 mcg/dose powder for inhalation  Inhale 1 puff twice a day by inhalation route.  Patient not taking: Reported on 2025   Lexis Beard MD       Current medications:    Current Facility-Administered Medications   Medication Dose Route Frequency Provider Last Rate Last Admin

## 2025-05-20 ENCOUNTER — RESULTS FOLLOW-UP (OUTPATIENT)
Age: 45
End: 2025-05-20

## 2025-05-20 NOTE — RESULT ENCOUNTER NOTE
As advised by Dr. Kern pt to follow up in clinic with Melissa Grinnell, PA to discuss results and symptoms. OV scheduled for 5/27/25 at 4:20pm.

## 2025-05-27 ENCOUNTER — OFFICE VISIT (OUTPATIENT)
Age: 45
End: 2025-05-27
Payer: COMMERCIAL

## 2025-05-27 VITALS
BODY MASS INDEX: 26.36 KG/M2 | HEIGHT: 64 IN | SYSTOLIC BLOOD PRESSURE: 117 MMHG | HEART RATE: 63 BPM | DIASTOLIC BLOOD PRESSURE: 72 MMHG | RESPIRATION RATE: 17 BRPM | OXYGEN SATURATION: 96 % | WEIGHT: 154.4 LBS

## 2025-05-27 DIAGNOSIS — R14.0 ABDOMINAL BLOATING: ICD-10-CM

## 2025-05-27 DIAGNOSIS — K30 INDIGESTION: Primary | ICD-10-CM

## 2025-05-27 PROCEDURE — 99214 OFFICE O/P EST MOD 30 MIN: CPT | Performed by: PHYSICIAN ASSISTANT

## 2025-05-27 RX ORDER — OMEPRAZOLE 20 MG/1
20 CAPSULE, DELAYED RELEASE ORAL
Qty: 180 CAPSULE | Refills: 1 | Status: SHIPPED | OUTPATIENT
Start: 2025-05-27

## 2025-05-27 NOTE — PATIENT INSTRUCTIONS
For reflux:   Eat smaller and more frequent meals. Avoid lying down for 3 hours after eating. Elevate the head of the bed by 6 inches. Avoid wearing tight-fitting clothing. Stop smoking and avoid alcohol. Avoid NSAID medications (Aspirin, Excedrin, Aleve, Ibuprofen, Goody Powder, Toradol, Mobic, Voltaren, etc). Consider weight loss to get to a healthy weight, which is often critical to eliminating symptoms of reflux. Avoid foods and acid-containing beverages that can exacerbate the symptoms of reflux. This includes:     -Caffeine  -Chocolate  -Peppermint  -Alcohol (especially red wine)  -Carbonated beverages  -Citrus fruits  -Tomato-based products  -Vinegar  -Spicy and greasy foods     For hemorrhoids:   Take Miralax 1 cap daily for constipation and titrate up or down as needed until having regular daily bowel movements. Increase daily fiber intake to 25-35 grams daily either by dietary intake or an over-the-counter psyllium husk fiber supplement. Limit alcohol and fatty food intake. Drink at least 80 oz water daily. Exercise regularly and consider weight loss if appropriate based on your current weight. Avoid straining or lingering on the toilet longer than necessary. Try scheduled toilet time approximately 30 minutes after your first drink or meal of the day. Elevate your feet when toileting to bring your knees in line with your hips using a small stool or Squatty Potty. Review your medication list and discuss with your PCP whether any of these medications may exacerbate constipation. Try warm sitz baths 2-3 times daily for acute hemorrhoidal flare-up. Apply topical or suppository steroid (such as Preparation H or Anusol) for no longer than 7 days. You can also try lidocaine creams (Preparation H + Lidocaine) or Recticare 5% for pain relief. Try witch hazel pads for itching or burning. Keep the anal area clean and dry; avoid excessive or aggressive wiping. Consider wet wipe usage as needed for hygiene and comfort.

## 2025-05-27 NOTE — PROGRESS NOTES
Hilaria Lopez (:  1980) is a 45 y.o. female established patient following up today for the chief complaint(s):  Follow-up and Procedure        Assessment & Plan   ASSESSMENT/PLAN:  1. Abdominal bloating  -     Celiac Ab tTg DGP TIgA; Future  2. Indigestion      Assessment & Plan    -Duodenal biopsy showed patchy mildly increased lymphocytes though no villous blunting.  Check celiac screening panel to rule out celiac disease.  -Counseled patient and provided written recommendations for diet and lifestyle modifications for GERD. Avoid tobacco, alcohol, NSAIDs. Start Prilosec 20 mg BID AC to address uncontrolled indigestion after meals and spicy foods.     Subjective   SUBJECTIVE/OBJECTIVE  Hilaria Lopez is a 45 y.o. year old female with PMH pertinent for asthma. Surgical history is pertinent for  x 3, abdominoplasty.  Family history is pertinent for maternal aunt with stomach and colon cancer (40).    Pertinent evaluation to-date includes:     -CT abdomen/pelvis 2025: enlarged, retroflexed, leiomyomatous uterus   - EGD 2025 (Dr. Kern): normal; duodenal biopsy showed patchy mildly increased intraepithelial lymphocytes, nonspecific with broad differential including medication effect, infectious etiologies and peptic injury, gluten sensitive enteropathy although not favored/diagnostic given absence of villous blunting, gastric biopsy showed mild reactive changes and minimal to mild chronic inflammation, negative H. pylori  -Colonoscopy 2025 (Dr. Kern): Good prep, internal hemorrhoids, otherwise normal colonoscopy; random colon biopsy showed mildly increased lamina propria eosinophils, mild and nonspecific with no evidence of lymphocytic or collagenous colitis, broad differential includes medication infectious etiologies, among other less likely systemic/allergic causes    Patient was initially evaluated in my office 2025 for a history of epigastric pain and

## (undated) DEVICE — GAUZE,SPONGE,4"X4",12PLY,WOVEN,NS,LF: Brand: MEDLINE

## (undated) DEVICE — MOUTHPIECE ENDOSCP L CTRL OPN AND SIDE PORTS DISP

## (undated) DEVICE — SINGLE PORT MANIFOLD: Brand: NEPTUNE 2

## (undated) DEVICE — KENDALL RADIOLUCENT FOAM MONITORING ELECTRODE RECTANGULAR SHAPE: Brand: KENDALL

## (undated) DEVICE — FORCEPS BX L240CM JAW DIA2.8MM L CAP W/ NDL MIC MESH TOOTH

## (undated) DEVICE — CONNECTOR TBNG OD5-7MM O2 END DISP

## (undated) DEVICE — CONTAINER FORMALIN PREFILLED 10% NBF 60ML

## (undated) DEVICE — SYRINGE MED 10ML LUERLOCK TIP W/O SFTY DISP

## (undated) DEVICE — DISPOSABLE BIOPSY VALVE MAJ-1555: Brand: SINGLE USE BIOPSY VALVE (STERILE)

## (undated) DEVICE — AIRLIFE™ OXYGEN TUBING 7 FEET (2.1 M) CRUSH RESISTANT OXYGEN TUBING, VINYL TIPPED: Brand: AIRLIFE™

## (undated) DEVICE — BLOCK BITE AD 60FR W/ VELC STRP ADDRESSES MOST PT AND

## (undated) DEVICE — LUBE JELLY FOIL PACK 1.4 OZ: Brand: MEDLINE INDUSTRIES, INC.

## (undated) DEVICE — SYRINGE MEDICAL 3ML CLEAR PLASTIC STANDARD NON CONTROL LUERLOCK TIP DISPOSABLE

## (undated) DEVICE — ENDOSCOPIC KIT 1.1+ OP4 CA DE 2 GWN AAMI LEVEL 3

## (undated) DEVICE — NEEDLE SYRINGE 18GA L1.5IN RED PLAS HUB S STL BLNT FILL W/O

## (undated) DEVICE — SOLUTION IRRIG 1000ML H2O PIC PLAS SHATTERPROOF CONTAINER